# Patient Record
Sex: FEMALE | Race: WHITE | URBAN - METROPOLITAN AREA
[De-identification: names, ages, dates, MRNs, and addresses within clinical notes are randomized per-mention and may not be internally consistent; named-entity substitution may affect disease eponyms.]

---

## 2021-08-03 ENCOUNTER — OFFICE VISIT (OUTPATIENT)
Dept: ENDOCRINOLOGY | Age: 31
End: 2021-08-03
Payer: COMMERCIAL

## 2021-08-03 VITALS
HEIGHT: 64 IN | OXYGEN SATURATION: 99 % | WEIGHT: 194 LBS | HEART RATE: 65 BPM | SYSTOLIC BLOOD PRESSURE: 118 MMHG | TEMPERATURE: 98 F | BODY MASS INDEX: 33.12 KG/M2 | RESPIRATION RATE: 14 BRPM | DIASTOLIC BLOOD PRESSURE: 80 MMHG

## 2021-08-03 DIAGNOSIS — E03.9 ACQUIRED HYPOTHYROIDISM: Primary | ICD-10-CM

## 2021-08-03 DIAGNOSIS — N91.5 OLIGOMENORRHEA, UNSPECIFIED TYPE: ICD-10-CM

## 2021-08-03 DIAGNOSIS — E66.9 CLASS 1 OBESITY WITH BODY MASS INDEX (BMI) OF 33.0 TO 33.9 IN ADULT, UNSPECIFIED OBESITY TYPE, UNSPECIFIED WHETHER SERIOUS COMORBIDITY PRESENT: ICD-10-CM

## 2021-08-03 DIAGNOSIS — E06.3 HASHIMOTO'S THYROIDITIS: ICD-10-CM

## 2021-08-03 DIAGNOSIS — R63.5 WEIGHT GAIN, ABNORMAL: ICD-10-CM

## 2021-08-03 PROBLEM — E66.811 CLASS 1 OBESITY WITH BODY MASS INDEX (BMI) OF 33.0 TO 33.9 IN ADULT: Status: ACTIVE | Noted: 2021-08-03

## 2021-08-03 PROCEDURE — 99205 OFFICE O/P NEW HI 60 MIN: CPT | Performed by: INTERNAL MEDICINE

## 2021-08-03 RX ORDER — LEVOTHYROXINE SODIUM 0.1 MG/1
TABLET ORAL DAILY
COMMUNITY
Start: 2021-05-13 | End: 2021-08-16 | Stop reason: DRUGHIGH

## 2021-08-03 NOTE — PROGRESS NOTES
SUBJECTIVE:  Patrica Gillespie is a 32 y.o. female who is being evaluated for hypothyroidism. 1. Acquired hypothyroidism  This started in 2004. Patient was diagnosed with hyperthyroidism, changed to hypothyroidism 2012. The problem has been gradually worsening. Patient has 3 children, 8 9 1year old girls  Patient's weight used to be 125-150 lbs    Previous thyroid studies include: TSH and free thyroxine. Patient started medication in [id number]. Currently patient is on: Levothyroxine. Misses 0 doses a month. Current complaints: fatigue, brain fog, weight gain, bleeding, cold hands, anxiety, depression, no motivation    2. Hashimoto's thyroiditis  History of obstructive symptoms: difficulty swallowing No, changes in voice/hoarseness Yes, raspier weight. History of radiation to patient's neck: No  Resent iodine exposure: No  Family history includes hypothyroidism father, grandmother  Family history of thyroid cancer: No    3. Class 1 obesity with body mass index (BMI) of 33.0 to 33.9 in adult, unspecified obesity type, unspecified whether serious comorbidity present  Patient eats healthy, exercises  Ideal weight 160 lbs  In 2015 patient was on Adipex and lost weight. She was getting medication in weight loss clinic in [place]. Tries to eat healthy, more vegetables, fish, meat  Never tried other weight loss medications     4. Oligomenorrhea, unspecified type  Menstrual cycles irregular, delayed by 12 days  Started menstrual cycle at 6years old  On legs has thighs she has black hair  Was told may have PCOS    5. Weight gain, abnormal  Dx at age 15. At that time she lost weight. In [id number] she gained weight. Fluctuated thyroid hormone levels. Patient states that her weight is fluctuating, she gains and loses weight. Eats healthy, exercises.       Past Medical History:   Diagnosis Date    Hashimoto's thyroiditis     Hypothyroidism     PCOS (polycystic ovarian syndrome)      Patient Active Problem List    Diagnosis Date Noted    Weight gain, abnormal 2021    Acquired hypothyroidism 2021    Hashimoto's thyroiditis 2021    Class 1 obesity with body mass index (BMI) of 33.0 to 33.9 in adult 2021    Oligomenorrhea 2021     Past Surgical History:   Procedure Laterality Date    BACK SURGERY      LUMBAR L2-L4    CYST INCISION AND DRAINAGE      OVARIAN CYST REMOVAL      TONSILLECTOMY       Family History   Problem Relation Age of Onset    Hyperthyroidism Father     Hyperthyroidism Paternal Aunt     Hyperthyroidism Paternal Grandmother      Social History     Socioeconomic History    Marital status:      Spouse name: None    Number of children: None    Years of education: None    Highest education level: None   Occupational History    None   Tobacco Use    Smoking status: Former Smoker     Packs/day: 0.50     Years: 5.00     Pack years: 2.50     Types: Cigarettes     Quit date:      Years since quittin.6    Smokeless tobacco: Never Used   Vaping Use    Vaping Use: Every day    Substances: Nicotine   Substance and Sexual Activity    Alcohol use: Yes     Comment: OCCASIONAL    Drug use: Never    Sexual activity: Yes     Partners: Male   Other Topics Concern    None   Social History Narrative    None     Social Determinants of Health     Financial Resource Strain:     Difficulty of Paying Living Expenses:    Food Insecurity:     Worried About Running Out of Food in the Last Year:     Ran Out of Food in the Last Year:    Transportation Needs:     Lack of Transportation (Medical):      Lack of Transportation (Non-Medical):    Physical Activity:     Days of Exercise per Week:     Minutes of Exercise per Session:    Stress:     Feeling of Stress :    Social Connections:     Frequency of Communication with Friends and Family:     Frequency of Social Gatherings with Friends and Family:     Attends Yazidism Services:     Active Member of Clubs or Organizations:     Attends Club or Organization Meetings:     Marital Status:    Intimate Partner Violence:     Fear of Current or Ex-Partner:     Emotionally Abused:     Physically Abused:     Sexually Abused:      Current Outpatient Medications   Medication Sig Dispense Refill    levothyroxine (SYNTHROID) 100 MCG tablet daily       No current facility-administered medications for this visit.      No Known Allergies  Family Status   Relation Name Status    Father  (Not Specified)    PAunt  (Not Specified)    PGM  (Not Specified)       Review of Systems:  Constitutional: has fatigue, no fever, has recent weight gain, no recent weight loss, no changes in appetite  Eyes: no eye pain, no change in vision, no eye redness, no eye irritation, no double vision  Ears, nose, throat: has nasal congestion, no sore throat, no earache, no decrease in hearing, no hoarseness, no dry mouth, has sinus problems, no difficulty swallowing, no neck lumps, no dental problems, no mouth sores, no ringing in ears  Pulmonary: no shortness of breath, no wheezing, has dyspnea on exertion, no cough  Cardiovascular: no chest pain, no lower extremity edema, no orthopnea, no intermittent leg claudication, no palpitations  Gastrointestinal: no abdominal pain, no nausea, no vomiting, has diarrhea, has constipation, no dysphagia, no heartburn, no bloating  Genitourinary: no dysuria, no urinary incontinence, no urinary hesitancy, no urinary frequency, no feelings of urinary urgency, has nocturia  Musculoskeletal: no joint swelling, no joint stiffness, no joint pain, no muscle cramps, no muscle pain  Integument/Breast: has hair loss, no skin rashes, no skin lesions, no itching, has dry skin  Neurological: no numbness, no tingling, no weakness, no confusion, no headaches, no dizziness, no fainting, no tremors, has decrease in memory, no balance problems  Psychiatric: has anxiety, has depression, has insomnia  Hematologic/Lymphatic: no GLOB  No results found for: TSHFT4, TSH, FT3  No results found for: LABA1C  No results found for: EAG  No results found for: CHOL  No results found for: TRIG  No results found for: HDL  No results found for: LDLCHOLESTEROL, LDLCALC  No results found for: LABVLDL, VLDL  No results found for: CHOLHDLRATIO  No results found for: LABMICR, [id number]  No results found for: DHTN20     ASSESSMENT/PLAN:  1. Acquired hypothyroidism    - levothyroxine (SYNTHROID) 100 MCG tablet; daily  - DHEA-Sulfate; Future  - Testosterone, free, total; Future  - 17-Hydroxyprogesterone; Future  - Thyroid Stimulating Immunoglobulin; Future  - Thyrotropin receptor antibody; Future  - TSH without Reflex; Future  - Comprehensive Metabolic Panel; Future  - Anti-Thyroglobulin Antibody; Future  - T3, Reverse; Future    2. Hashimoto's thyroiditis    - T3, Free; Future  - T4; Future  - T4, Free; Future  - T3; Future  - Thyroid Peroxidase Antibody; Future  - Thyroid Stimulating Immunoglobulin; Future  - Thyrotropin receptor antibody; Future  - TSH without Reflex; Future  - Comprehensive Metabolic Panel; Future  - Anti-Thyroglobulin Antibody; Future  - T3, Reverse; Future  - US HEAD NECK SOFT TISSUE THYROID; Future    3. Class 1 obesity with body mass index (BMI) of 33.0 to 33.9 in adult, unspecified obesity type, unspecified whether serious comorbidity present      4. Oligomenorrhea, unspecified type    - ACTH; Future  - Prolactin; Future  - Cortisol AM, Total; Future  - Follicle Stimulating Hormone; Future  - Luteinizing Hormone; Future  - Insulin-Like Growth Factor; Future  - Estradiol; Future  - DHEA-Sulfate; Future  - Testosterone, free, total; Future  - 17-Hydroxyprogesterone; Future  - T3, Free; Future  - T4; Future  - T4, Free; Future  - T3; Future  - Thyroid Peroxidase Antibody; Future  - Thyroid Stimulating Immunoglobulin; Future  - Thyrotropin receptor antibody; Future  - TSH without Reflex; Future  - Comprehensive Metabolic Panel;  Future  - Anti-Thyroglobulin Antibody; Future  - T3, Reverse; Future  - SALIVARY CORTISOL; Future  - SALIVARY CORTISOL; Future  - SALIVARY CORTISOL; Future    5. Weight gain, abnormal    - ACTH; Future  - Prolactin; Future  - Cortisol AM, Total; Future  - Follicle Stimulating Hormone; Future  - Luteinizing Hormone; Future  - Insulin-Like Growth Factor; Future  - Estradiol; Future  - DHEA-Sulfate; Future  - Testosterone, free, total; Future  - 17-Hydroxyprogesterone; Future  - T3, Free; Future  - T4; Future  - T4, Free; Future  - T3; Future  - Thyroid Peroxidase Antibody; Future  - Thyroid Stimulating Immunoglobulin; Future  - Thyrotropin receptor antibody; Future  - TSH without Reflex; Future  - Comprehensive Metabolic Panel; Future  - Anti-Thyroglobulin Antibody; Future  - T3, Reverse; Future  - SALIVARY CORTISOL; Future  - SALIVARY CORTISOL; Future  - SALIVARY CORTISOL; Future      Reviewed and/or ordered clinical lab results Yes  Reviewed and/or ordered radiology tests No   Reviewed and/or ordered other diagnostic tests No  Discussed test results with performing physician No  Independently reviewed image, tracing, or specimen No  Made a decision to obtain old records No  Reviewed and summarized old records Yes   TSH 9.-74.12-3.13  TPO, Tg ab negative  Obtained history from other than patient No    Martina Andrews was counseled regarding symptoms of hypothyroidism, Hashimoto's thyroiditis, oligomenorrhea, abnormal weight gain diagnosis, course and complications of disease if inadequately treated, side effects of medications, diagnosis, treatment options, and prognosis, risks, benefits, complications, and alternatives of treatment, labs, imaging and other studies and treatment targets and goals, endocrine causes of weight gain, thyroid related symptoms. She understands instructions and counseling.     Total time I spent for this encounter 60 minutes    Return in about 1 month (around 9/3/2021) for thyroid problems.     Electronically signed by Jason Buckley MD on 8/7/2021 at 10:35 PM

## 2021-08-07 PROBLEM — R63.5 WEIGHT GAIN, ABNORMAL: Status: ACTIVE | Noted: 2021-08-07

## 2021-08-15 ENCOUNTER — TELEPHONE (OUTPATIENT)
Dept: ENDOCRINOLOGY | Age: 31
End: 2021-08-15

## 2021-08-15 LAB
ALBUMIN/GLOBULIN RATIO: 1.9 RATIO (ref 0.8–2.6)
ALBUMIN: 4.7 G/DL (ref 3.5–5.2)
ALP BLD-CCNC: 52 U/L (ref 23–144)
ALT SERPL-CCNC: 21 U/L (ref 0–60)
AST SERPL-CCNC: 22 U/L (ref 0–55)
BILIRUB SERPL-MCNC: 0.3 MG/DL (ref 0–1.2)
BUN BLDV-MCNC: 15 MG/DL (ref 3–29)
BUN/CREAT BLD: 14 (ref 7–25)
CALCIUM SERPL-MCNC: 10.1 MG/DL (ref 8.5–10.5)
CHLORIDE BLD-SCNC: 102 MEQ/L (ref 96–110)
CO2: 23 MEQ/L (ref 19–32)
CORTISOL - AM: 13.2 MCG/DL (ref 4–22)
CREAT SERPL-MCNC: 1.1 MG/DL (ref 0.5–1.2)
DHEAS (DHEA SULFATE): 154 MCG/DL (ref 40–325)
ESTRADIOL LEVEL: 22 PG/ML
FOLLICLE STIMULATING HORMONE: 6.9 MCIU/ML
GFR AFRICAN AMERICAN: 78 MLS/MIN/1.73M2
GFR NON-AFRICAN AMERICAN: 67 MLS/MIN/1.73M2
GLOBULIN: 2.5 G/DL (ref 1.9–3.6)
GLUCOSE BLD-MCNC: 98 MG/DL (ref 70–99)
LUTEINIZING HORMONE: 12.8 MIU/ML
POTASSIUM SERPL-SCNC: 4.5 MEQ/L (ref 3.4–5.3)
PROLACTIN: 15 NG/ML
SODIUM BLD-SCNC: 137 MEQ/L (ref 135–148)
STATUS: NORMAL
T3 FREE: 2.2 PG/ML (ref 2.3–4.2)
T3 TOTAL: 85 NG/DL (ref 80–200)
T4 FREE: 0.74 NG/DL (ref 0.8–1.8)
T4 TOTAL: 5.4 MCG/DL (ref 4–12.9)
TOTAL PROTEIN: 7.2 G/DL (ref 6–8.3)
TSH SERPL DL<=0.05 MIU/L-ACNC: 92.3 MCIU/ML (ref 0.4–4.5)

## 2021-08-16 LAB
ANTI-THYROGLOB ABS: 6.99
THYROID PEROXIDASE ANTIBODIES: 930 EIA

## 2021-08-16 RX ORDER — LEVOTHYROXINE SODIUM 125 MCG
125 TABLET ORAL
Qty: 30 TABLET | Refills: 3 | Status: SHIPPED | OUTPATIENT
Start: 2021-08-16 | End: 2021-11-02 | Stop reason: SDUPTHER

## 2021-08-16 NOTE — TELEPHONE ENCOUNTER
Patient is agreeable to trying the 0.125 daily. She states that she has been on Synthroid in the past and as far as she knows she did well with it. She is okay with trying it again if insurance is okay with it. Please send script to the pharmacy.

## 2021-08-16 NOTE — TELEPHONE ENCOUNTER
Please inform patient that the TSH came very abnormal, TSH was 92.3. Advise to increase levothyroxine. Please ask if she takes it away from food by 1 hour, and does not take any supplements with it. I recommend to increase levothyroxine to 0.125 mg daily. Ask if she tried brand Synthroid before. Might be more reliable absorption. Also inform patient that her thyroid has multiple small nodules, no biopsy or any other intervention at this time. Follow-up with thyroid ultrasound.

## 2021-08-16 NOTE — TELEPHONE ENCOUNTER
I sent prescription to pharmacy. If PA required, please indicate that patient failed levothyroxine and is not absorbing levothyroxine at all with TSH of 92.

## 2021-08-19 LAB — ADRENOCORTICOTROPIC HORMONE: 25 PG/ML (ref 6–50)

## 2021-08-20 LAB
IGF-1 (INSULIN-LIKE GROWTH I): 141 NG/ML (ref 53–331)
Lab: NORMAL
T3 REVERSE: 8 NG/DL (ref 8–25)
TESTOSTERONE FREE: 4.7 PG/ML (ref 0.1–6.4)
TESTOSTERONE TOTAL: 23 NG/DL (ref 2–45)

## 2021-08-22 LAB — 17-HYDROXYPROGESTERONE: 35 NG/DL

## 2021-11-02 ENCOUNTER — OFFICE VISIT (OUTPATIENT)
Dept: ENDOCRINOLOGY | Age: 31
End: 2021-11-02
Payer: COMMERCIAL

## 2021-11-02 VITALS
BODY MASS INDEX: 33.12 KG/M2 | OXYGEN SATURATION: 98 % | HEART RATE: 65 BPM | HEIGHT: 64 IN | RESPIRATION RATE: 14 BRPM | WEIGHT: 194 LBS | TEMPERATURE: 98 F | DIASTOLIC BLOOD PRESSURE: 68 MMHG | SYSTOLIC BLOOD PRESSURE: 116 MMHG

## 2021-11-02 DIAGNOSIS — E66.9 CLASS 1 OBESITY WITH BODY MASS INDEX (BMI) OF 33.0 TO 33.9 IN ADULT, UNSPECIFIED OBESITY TYPE, UNSPECIFIED WHETHER SERIOUS COMORBIDITY PRESENT: ICD-10-CM

## 2021-11-02 DIAGNOSIS — E04.2 MULTINODULAR THYROID: ICD-10-CM

## 2021-11-02 DIAGNOSIS — E06.3 HASHIMOTO'S THYROIDITIS: ICD-10-CM

## 2021-11-02 DIAGNOSIS — N91.5 OLIGOMENORRHEA, UNSPECIFIED TYPE: ICD-10-CM

## 2021-11-02 DIAGNOSIS — E03.9 ACQUIRED HYPOTHYROIDISM: Primary | ICD-10-CM

## 2021-11-02 PROCEDURE — 99215 OFFICE O/P EST HI 40 MIN: CPT | Performed by: INTERNAL MEDICINE

## 2021-11-02 RX ORDER — PHENTERMINE HYDROCHLORIDE 37.5 MG/1
37.5 TABLET ORAL
Qty: 30 TABLET | Refills: 0 | Status: SHIPPED | OUTPATIENT
Start: 2021-11-02 | End: 2021-12-02

## 2021-11-02 RX ORDER — LEVOTHYROXINE SODIUM 125 MCG
125 TABLET ORAL
Qty: 30 TABLET | Refills: 3 | Status: SHIPPED
Start: 2021-11-02 | End: 2021-11-08 | Stop reason: DRUGHIGH

## 2021-11-02 NOTE — PROGRESS NOTES
SUBJECTIVE:  Samantha Sanchez is a 32 y.o. female who is being evaluated for hypothyroidism. 1. Acquired hypothyroidism  This started in 2004. Patient was diagnosed with hyperthyroidism, changed to hypothyroidism 2012. The problem has been gradually worsening. Patient has 3 children, 8 9 1year old girls  Patient's weight used to be 125-150 lbs    Previous thyroid studies include: TSH and free thyroxine. Patient started medication in [id number]. Currently patient is on: Levothyroxine. Misses 0 doses a month. Current complaints: fatigue, brain fog, weight gain, bleeding, cold hands, anxiety, depression, no motivation    2. Hashimoto's thyroiditis  Has fatigue    3. Class 1 obesity with body mass index (BMI) of 33.0 to 33.9 in adult, unspecified obesity type, unspecified whether serious comorbidity present  Patient eats healthy, exercises  Ideal weight 160 lbs  In 2015 patient was on Adipex and lost weight. She was getting medication in weight loss clinic in [place]. Tries to eat healthy, more vegetables, fish, meat  Never tried other weight loss medications     4. Oligomenorrhea, unspecified type  Menstrual cycles irregular, delayed by 12 days  Started menstrual cycle at 6years old  On legs has thighs she has black hair  Was told may have PCOS    5. Weight gain, abnormal  Dx at age 15. At that time she lost weight. In [id number] she gained weight. Fluctuated thyroid hormone levels. Patient states that her weight is fluctuating, she gains and loses weight. Eats healthy, exercises. 6.  Multinodular thyroid  History of obstructive symptoms: difficulty swallowing No, changes in voice/hoarseness Yes, raspier weight.   History of radiation to patient's neck: No  Resent iodine exposure: No  Family history includes hypothyroidism father, grandmother  Family history of thyroid cancer: No      8/9/2021  EXAM:  US Thyroid.       CLINICAL: Hashimoto's thyroiditis       COMPARISON: There is no prior study for comparison.       TECHNIQUE: Grayscale and color Doppler ultrasound evaluation of the thyroid gland was    performed.       FINDINGS:       Right thyroid lobe measures 3.1 x 0.8 x 0.9 cm and left lobe measures 3.4 x 0.7 x 0.9 cm. Thyroid isthmus thickness 1.2 mm. Diffuse thyroid heterogeneous echogenicity is present with    normal appearing thyroid vascularity.       Inferior right thyroid 5 x 4 x 3 mm hypoechoic wider than tall solid appearing nodule with    internal flow on color Doppler is TI-RADS category 4 requiring no follow-up.       Superior left thyroid 4 x 4 by 3 mm wider than tall hypoechoic solid appearing nodule TI-RADS    category 4 requiring no follow-up. Additional superior left thyroid 4 x 4 by 2 mm wider than    tall hypoechoic solid appearing nodule TI-RADS category 4 requiring no follow-up.           IMPRESSION:       Heterogeneous thyroid echogenicity suggesting sequela of thyroiditis with bilateral 5 mm and    smaller solid TI-RADS category 4 nodules.  Based on ACR TI-RADS criteria no specific follow-up    recommended.                   Past Medical History:   Diagnosis Date    Hashimoto's thyroiditis     Hypothyroidism     PCOS (polycystic ovarian syndrome)      Patient Active Problem List    Diagnosis Date Noted    Multinodular thyroid 11/02/2021    Weight gain, abnormal 08/07/2021    Acquired hypothyroidism 08/03/2021    Hashimoto's thyroiditis 08/03/2021    Class 1 obesity with body mass index (BMI) of 33.0 to 33.9 in adult 08/03/2021    Oligomenorrhea 08/03/2021     Past Surgical History:   Procedure Laterality Date    BACK SURGERY      LUMBAR L2-L4    CYST INCISION AND DRAINAGE      OVARIAN CYST REMOVAL      TONSILLECTOMY       Family History   Problem Relation Age of Onset    Hyperthyroidism Father     Hyperthyroidism Paternal Aunt     Hyperthyroidism Paternal Grandmother      Social History     Socioeconomic History    Marital status:      Spouse name: None  Number of children: None    Years of education: None    Highest education level: None   Occupational History    None   Tobacco Use    Smoking status: Former Smoker     Packs/day: 0.50     Years: 5.00     Pack years: 2.50     Types: Cigarettes     Quit date:      Years since quittin.8    Smokeless tobacco: Never Used    Tobacco comment: quit in    Vaping Use    Vaping Use: Every day    Substances: Nicotine   Substance and Sexual Activity    Alcohol use: Not Currently     Comment: OCCASIONAL    Drug use: Never    Sexual activity: Yes     Partners: Male   Other Topics Concern    None   Social History Narrative    None     Social Determinants of Health     Financial Resource Strain:     Difficulty of Paying Living Expenses:    Food Insecurity:     Worried About Running Out of Food in the Last Year:     Ran Out of Food in the Last Year:    Transportation Needs:     Lack of Transportation (Medical):  Lack of Transportation (Non-Medical):    Physical Activity:     Days of Exercise per Week:     Minutes of Exercise per Session:    Stress:     Feeling of Stress :    Social Connections:     Frequency of Communication with Friends and Family:     Frequency of Social Gatherings with Friends and Family:     Attends [employer] Services:     Active Member of Clubs or Organizations:     Attends Club or Organization Meetings:     Marital Status:    Intimate Partner Violence:     Fear of Current or Ex-Partner:     Emotionally Abused:     Physically Abused:     Sexually Abused:      Current Outpatient Medications   Medication Sig Dispense Refill    SYNTHROID 125 MCG tablet Take 1 tablet by mouth every morning (before breakfast) 30 tablet 3    phentermine (ADIPEX-P) 37.5 MG tablet Take 1 tablet by mouth every morning (before breakfast) for 30 days. 30 tablet 0     No current facility-administered medications for this visit.      No Known Allergies  Family Status   Relation Name Status kg/m².     OBJECTIVE:  Constitutional: no acute distress, well appearing and well nourished  Psychiatric: oriented to person, place and time, judgement and insight and normal, recent and remote memory and intact and mood and affect are normal  Skin: skin and subcutaneous tissue is normal without mass, normal turgor  Head and Face: examination of head and face revealed no abnormalities  Eyes: no lid or conjunctival swelling, erythema or discharge, pupils are normal, equal, round, reactive to light  Ears/Nose: external inspection of ears and nose revealed no abnormalities, hearing is grossly normal  Oropharynx/Mouth/Face: lips, tongue and gums are normal with no lesions, the voice quality was normal  Neck: neck is supple and symmetric, with midline trachea and no masses, thyroid is normal  Lymphatics: normal cervical lymph nodes, normal supraclavicular nodes  Pulmonary: no increased work of breathing or signs of respiratory distress, lungs are clear to auscultation  Cardiovascular: normal heart rate and rhythm, normal S1 and S2, no murmurs and pedal pulses and 2+ bilaterally, No edema  Abdomen: abdomen is soft, non-tender with no masses  Musculoskeletal: normal gait and station and exam of the digits and nails are normal  Neurological: normal coordination and normal general cortical function      Lab Review:    No results found for: WBC, HGB, HCT, MCV, PLT  Lab Results   Component Value Date     08/14/2021    K 4.5 08/14/2021     08/14/2021    CO2 23 08/14/2021    BUN 15 08/14/2021    CREATININE 1.1 08/14/2021    GLUCOSE 98 08/14/2021    CALCIUM 10.1 08/14/2021    PROT 7.2 08/14/2021    LABALBU 4.7 08/14/2021    BILITOT 0.3 08/14/2021    ALKPHOS 52 08/14/2021    AST 22 08/14/2021    ALT 21 08/14/2021    LABGLOM 67 08/14/2021    GFRAA 78 08/14/2021    GLOB 2.5 08/14/2021     Lab Results   Component Value Date    TSH 92.300 08/14/2021    FT3 2.2 08/14/2021     No results found for: LABA1C  No results found for: EAG  No results found for: CHOL  No results found for: TRIG  No results found for: HDL  No results found for: LDLCHOLESTEROL, LDLCALC  No results found for: LABVLDL, VLDL  No results found for: CHOLHDLRATIO  No results found for: LABMICR, [id number]  No results found for: VITD25     ASSESSMENT/PLAN:  1. Acquired hypothyroidism  Had fluctuating levels before  Call for results  Severely uncontrolled  Changed levothyroxine to Synthroid then increased to 0.125 mg daily last appointment  TSH 92.3  - TSH without Reflex; Future  Free T4  Free T3    2. Hashimoto's thyroiditis  Positive TPO antibodies  Positive thyroglobulin antibodies  - Thyroid Peroxidase Antibody; Future  - Anti-Thyroglobulin Antibody; Future    3. Class 1 obesity with body mass index (BMI) of 33.0 to 33.9 in adult, unspecified obesity type, unspecified whether serious comorbidity present  Continue Diet, exercise  Eats healthier  Start Phentermine    4. Oligomenorrhea, unspecified type  Continue monitoring  Prolactin 15  Estradiol, LH, FSH, 17 hydroxyprogesterone, IGF-I normal  DHEA-S 154  Testosterone normal  - SALIVARY CORTISOL; Future  - SALIVARY CORTISOL; Future  - SALIVARY CORTISOL; Future    5. Weight gain, abnormal  Diet, exercise  Obtain salivary cortisol    6. Multinodular thyroid  Small nodules  Thyroid ultrasound 8/9/2021right 5 mm, left 4 mm and another 4 mm nodules.   Counseled patient about monitoring thyroid nodule growth      Reviewed and/or ordered clinical lab results Yes  Reviewed and/or ordered radiology tests No   Reviewed and/or ordered other diagnostic tests No  Discussed test results with performing physician No  Independently reviewed image, tracing, or specimen No  Made a decision to obtain old records No  Reviewed and summarized old records Yes   TSH 9.-74.12-3.13  TPO, Tg ab negative  Obtained history from other than patient No    Divine Tilley was counseled regarding symptoms of hypothyroidism, Hashimoto's thyroiditis, oligomenorrhea, abnormal weight gain diagnosis, course and complications of disease if inadequately treated, side effects of medications, diagnosis, treatment options, and prognosis, risks, benefits, complications, and alternatives of treatment, labs, imaging and other studies and treatment targets and goals, endocrine causes of weight gain, thyroid related symptoms, weight management, phentermine side effects  She understands instructions and counseling. Total time I spent for this encounter 40 minutes    Return in about 1 month (around 12/2/2021) for weight management, thyroid problems.     Electronically signed by Jerry Monroy MD on 11/2/2021 at 10:27 PM

## 2021-11-07 LAB
T3 FREE: 3 PG/ML (ref 2.3–4.2)
T4 FREE: 1.17 NG/DL (ref 0.8–1.8)
TSH SERPL DL<=0.05 MIU/L-ACNC: 7.76 MCIU/ML (ref 0.4–4.5)

## 2021-11-08 RX ORDER — LEVOTHYROXINE SODIUM 137 MCG
137 TABLET ORAL
Qty: 30 TABLET | Refills: 3 | Status: SHIPPED
Start: 2021-11-08 | End: 2022-03-07 | Stop reason: DRUGHIGH

## 2021-11-11 LAB
CORTISOL, LC/MS/MS,SALIVA: <0.03 MCG/DL

## 2022-03-04 ENCOUNTER — TELEPHONE (OUTPATIENT)
Dept: ENDOCRINOLOGY | Age: 32
End: 2022-03-04

## 2022-03-04 ENCOUNTER — OFFICE VISIT (OUTPATIENT)
Dept: ENDOCRINOLOGY | Age: 32
End: 2022-03-04
Payer: COMMERCIAL

## 2022-03-04 VITALS
OXYGEN SATURATION: 99 % | DIASTOLIC BLOOD PRESSURE: 66 MMHG | TEMPERATURE: 98 F | WEIGHT: 189 LBS | SYSTOLIC BLOOD PRESSURE: 108 MMHG | RESPIRATION RATE: 14 BRPM | HEIGHT: 64 IN | BODY MASS INDEX: 32.27 KG/M2 | HEART RATE: 56 BPM

## 2022-03-04 DIAGNOSIS — E06.3 HASHIMOTO'S THYROIDITIS: Primary | ICD-10-CM

## 2022-03-04 DIAGNOSIS — R63.5 WEIGHT GAIN, ABNORMAL: ICD-10-CM

## 2022-03-04 DIAGNOSIS — E03.9 ACQUIRED HYPOTHYROIDISM: ICD-10-CM

## 2022-03-04 DIAGNOSIS — E03.9 ACQUIRED HYPOTHYROIDISM: Primary | ICD-10-CM

## 2022-03-04 DIAGNOSIS — E04.2 MULTINODULAR THYROID: ICD-10-CM

## 2022-03-04 DIAGNOSIS — E06.3 HASHIMOTO'S THYROIDITIS: ICD-10-CM

## 2022-03-04 DIAGNOSIS — N91.5 OLIGOMENORRHEA, UNSPECIFIED TYPE: ICD-10-CM

## 2022-03-04 DIAGNOSIS — E66.9 CLASS 1 OBESITY WITH BODY MASS INDEX (BMI) OF 32.0 TO 32.9 IN ADULT, UNSPECIFIED OBESITY TYPE, UNSPECIFIED WHETHER SERIOUS COMORBIDITY PRESENT: ICD-10-CM

## 2022-03-04 LAB
T3 FREE: 2.8 PG/ML (ref 2.3–4.2)
T4 FREE: 1.1 NG/DL (ref 0.9–1.8)
TSH SERPL DL<=0.05 MIU/L-ACNC: 16.07 UIU/ML (ref 0.27–4.2)

## 2022-03-04 PROCEDURE — 99214 OFFICE O/P EST MOD 30 MIN: CPT | Performed by: INTERNAL MEDICINE

## 2022-03-04 RX ORDER — PHENTERMINE AND TOPIRAMATE 3.75; 23 MG/1; MG/1
1 CAPSULE, EXTENDED RELEASE ORAL DAILY
Qty: 14 CAPSULE | Refills: 0 | Status: SHIPPED | OUTPATIENT
Start: 2022-03-07 | End: 2022-03-21

## 2022-03-04 RX ORDER — PHENTERMINE HYDROCHLORIDE 37.5 MG/1
37.5 TABLET ORAL
Qty: 30 TABLET | Refills: 0 | Status: SHIPPED
Start: 2022-03-04 | End: 2022-03-04 | Stop reason: CLARIF

## 2022-03-04 RX ORDER — PHENTERMINE AND TOPIRAMATE 7.5; 46 MG/1; MG/1
1 CAPSULE, EXTENDED RELEASE ORAL DAILY
Qty: 28 CAPSULE | Refills: 0 | Status: SHIPPED | OUTPATIENT
Start: 2022-03-21 | End: 2022-04-18

## 2022-03-04 NOTE — TELEPHONE ENCOUNTER
Phentermine is not covered for another 6 months and needs the other medication that was reviewed. Pt could remember the other name but Dr. Janet Schwartz knows. Please advise?

## 2022-03-04 NOTE — TELEPHONE ENCOUNTER
Spoke with patient regarding denial of phentermine. We will start Qsymia 3.75/23 mg dose for 2weeks, then 7.5/46 mg dose for 4 weeks. Please fill the form and send both prescriptions to specialty pharmacy. I informed patient that there is opening on Wednesday, April 13 at 3:10 PM in [facility]. Please schedule patient for that appointment for weight follow-up. Sent patient a message regarding lab results. Recommended to increase Synthroid to 0.150 mg daily. I do not see the response to the message. Please make sure patient saw the message. Let me know and I will send a prescription for the new dose of levothyroxine 0.15 mg daily.

## 2022-03-04 NOTE — PROGRESS NOTES
SUBJECTIVE:  Skylar Michele is a 28 y.o. female who is being evaluated for hypothyroidism. 1. Acquired hypothyroidism  This started in 2004. Patient was diagnosed with hyperthyroidism, changed to hypothyroidism 2012. The problem has been gradually worsening. Patient has 3 children, 8 9 1year old girls  Patient's weight used to be 125-150 lbs    Previous thyroid studies include: TSH and free thyroxine. Patient started medication in 0425-2107. Currently patient is on: Levothyroxine. Misses 0 doses a month. Current complaints: fatigue, brain fog, weight gain, bleeding, cold hands, anxiety, depression, no motivation    2. Hashimoto's thyroiditis  Has fatigue    3. Obesity  Patient eats healthy, exercises  Ideal weight 160 lbs  In 2015 patient was on Adipex and lost weight. She was getting medication in weight loss clinic in [place]. Tries to eat healthy, more vegetables, fish, meat  Never tried other weight loss medications     4. Oligomenorrhea, unspecified type  Menstrual cycles irregular, delayed by 12 days  Started menstrual cycle at 6years old  On legs has thighs she has black hair  Was told may have PCOS  Dx at age 15. At that time she lost weight. In 4928-5986 she gained weight. Fluctuated thyroid hormone levels. Patient states that her weight is fluctuating, she gains and loses weight. Eats healthy, exercises. 5.  Multinodular thyroid  History of obstructive symptoms: difficulty swallowing No, changes in voice/hoarseness Yes, raspier weight.   History of radiation to patient's neck: No  Resent iodine exposure: No  Family history includes hypothyroidism father, grandmother  Family history of thyroid cancer: No      8/9/2021  EXAM:  US Thyroid.       CLINICAL: Hashimoto's thyroiditis       COMPARISON: There is no prior study for comparison.       TECHNIQUE: Grayscale and color Doppler ultrasound evaluation of the thyroid gland was    performed.       FINDINGS:       Right thyroid lobe measures 3.1 x 0.8 x 0.9 cm and left lobe measures 3.4 x 0.7 x 0.9 cm. Thyroid isthmus thickness 1.2 mm. Diffuse thyroid heterogeneous echogenicity is present with    normal appearing thyroid vascularity.       Inferior right thyroid 5 x 4 x 3 mm hypoechoic wider than tall solid appearing nodule with    internal flow on color Doppler is TI-RADS category 4 requiring no follow-up.       Superior left thyroid 4 x 4 by 3 mm wider than tall hypoechoic solid appearing nodule TI-RADS    category 4 requiring no follow-up. Additional superior left thyroid 4 x 4 by 2 mm wider than    tall hypoechoic solid appearing nodule TI-RADS category 4 requiring no follow-up.           IMPRESSION:       Heterogeneous thyroid echogenicity suggesting sequela of thyroiditis with bilateral 5 mm and    smaller solid TI-RADS category 4 nodules.  Based on ACR TI-RADS criteria no specific follow-up    recommended.                   Past Medical History:   Diagnosis Date    Hashimoto's thyroiditis     Hypothyroidism     PCOS (polycystic ovarian syndrome)      Patient Active Problem List    Diagnosis Date Noted    Multinodular thyroid 11/02/2021    Weight gain, abnormal 08/07/2021    Acquired hypothyroidism 08/03/2021    Hashimoto's thyroiditis 08/03/2021    Class 1 obesity with body mass index (BMI) of 33.0 to 33.9 in adult 08/03/2021    Oligomenorrhea 08/03/2021     Past Surgical History:   Procedure Laterality Date    BACK SURGERY      LUMBAR L2-L4    CYST INCISION AND DRAINAGE      OVARIAN CYST REMOVAL      TONSILLECTOMY       Family History   Problem Relation Age of Onset    Hyperthyroidism Father     Hyperthyroidism Paternal Aunt     Hyperthyroidism Paternal Grandmother      Social History     Socioeconomic History    Marital status:      Spouse name: None    Number of children: None    Years of education: None    Highest education level: None   Occupational History    None   Tobacco Use    Smoking status: Former Smoker     Packs/day: 0.50     Years: 5.00     Pack years: 2.50     Types: Cigarettes     Quit date: 2015     Years since quittin.1    Smokeless tobacco: Never Used    Tobacco comment: quit in    Vaping Use    Vaping Use: Every day    Substances: Nicotine   Substance and Sexual Activity    Alcohol use: Not Currently     Comment: OCCASIONAL    Drug use: Never    Sexual activity: Yes     Partners: Male   Other Topics Concern    None   Social History Narrative    None     Social Determinants of Health     Financial Resource Strain:     Difficulty of Paying Living Expenses: Not on file   Food Insecurity:     Worried About Running Out of Food in the Last Year: Not on file    Jong of Food in the Last Year: Not on file   Transportation Needs:     Lack of Transportation (Medical): Not on file    Lack of Transportation (Non-Medical): Not on file   Physical Activity:     Days of Exercise per Week: Not on file    Minutes of Exercise per Session: Not on file   Stress:     Feeling of Stress : Not on file   Social Connections:     Frequency of Communication with Friends and Family: Not on file    Frequency of Social Gatherings with Friends and Family: Not on file    Attends Judaism Services: Not on file    Active Member of [address] [place] [address] [employer] or Organizations: Not on file    Attends Club or Organization Meetings: Not on file    Marital Status: Not on file   Intimate Partner Violence:     Fear of Current or Ex-Partner: Not on file    Emotionally Abused: Not on file    Physically Abused: Not on file    Sexually Abused: Not on file   Housing Stability:     Unable to Pay for Housing in the Last Year: Not on file    Number of [place] in the Last Year: Not on file    Unstable Housing in the Last Year: Not on file     Current Outpatient Medications   Medication Sig Dispense Refill    [START ON 3/7/2022] QSYMIA 3.75-23 MG CP24 Take 1 capsule by mouth daily for 14 days.  14 capsule 0    [START ON 3/21/2022] QSYMIA 7.5-46 MG CP24 Take 1 capsule by mouth daily for 28 days. 28 capsule 0    SYNTHROID 137 MCG tablet Take 1 tablet by mouth every morning (before breakfast) 30 tablet 3     No current facility-administered medications for this visit.      No Known Allergies  Family Status   Relation Name Status    Father  (Not Specified)    PAunt  (Not Specified)    PGM  (Not Specified)       Review of Systems:  Constitutional: has fatigue, no fever, has recent weight gain, no recent weight loss, no changes in appetite  Eyes: no eye pain, no change in vision, no eye redness, no eye irritation, no double vision  Ears, nose, throat: has nasal congestion, no sore throat, no earache, no decrease in hearing, no hoarseness, no dry mouth, has sinus problems, no difficulty swallowing, no neck lumps, no dental problems, no mouth sores, no ringing in ears  Pulmonary: no shortness of breath, no wheezing, has dyspnea on exertion, no cough  Cardiovascular: no chest pain, no lower extremity edema, no orthopnea, no intermittent leg claudication, no palpitations  Gastrointestinal: no abdominal pain, no nausea, no vomiting, has diarrhea, has constipation, no dysphagia, no heartburn, no bloating  Genitourinary: no dysuria, no urinary incontinence, no urinary hesitancy, no urinary frequency, no feelings of urinary urgency, has nocturia  Musculoskeletal: no joint swelling, no joint stiffness, no joint pain, no muscle cramps, no muscle pain  Integument/Breast: has hair loss, no skin rashes, no skin lesions, no itching, has dry skin  Neurological: no numbness, no tingling, no weakness, no confusion, no headaches, no dizziness, no fainting, no tremors, has decrease in memory, no balance problems  Psychiatric: has anxiety, has depression, has insomnia  Hematologic/Lymphatic: no tendency for easy bleeding, no swollen lymph nodes, no tendency for easy bruising  Immunology: has seasonal allergies, no frequent infections, no frequent illnesses  Endocrine: has temperature intolerance    /66   Pulse 56   Temp 98 °F (36.7 °C)   Resp 14   Ht 5' 4\" (1.626 m)   Wt 189 lb (85.7 kg)   SpO2 99%   BMI 32.44 kg/m²    Wt Readings from Last 3 Encounters:   03/04/22 189 lb (85.7 kg)   11/02/21 194 lb (88 kg)   08/03/21 194 lb (88 kg)     Body mass index is 32.44 kg/m².     OBJECTIVE:  Constitutional: no acute distress, well appearing and well nourished  Psychiatric: oriented to person, place and time, judgement and insight and normal, recent and remote memory and intact and mood and affect are normal  Skin: skin and subcutaneous tissue is normal without mass, normal turgor  Head and Face: examination of head and face revealed no abnormalities  Eyes: no lid or conjunctival swelling, erythema or discharge, pupils are normal, equal, round, reactive to light  Ears/Nose: external inspection of ears and nose revealed no abnormalities, hearing is grossly normal  Oropharynx/Mouth/Face: lips, tongue and gums are normal with no lesions, the voice quality was normal  Neck: neck is supple and symmetric, with midline trachea and no masses, thyroid is normal  Lymphatics: normal cervical lymph nodes, normal supraclavicular nodes  Pulmonary: no increased work of breathing or signs of respiratory distress, lungs are clear to auscultation  Cardiovascular: normal heart rate and rhythm, normal S1 and S2, no murmurs and pedal pulses and 2+ bilaterally, No edema  Abdomen: abdomen is soft, non-tender with no masses  Musculoskeletal: normal gait and station and exam of the digits and nails are normal  Neurological: normal coordination and normal general cortical function      Lab Review:    No results found for: WBC, HGB, HCT, MCV, PLT  Lab Results   Component Value Date     08/14/2021    K 4.5 08/14/2021     08/14/2021    CO2 23 08/14/2021    BUN 15 08/14/2021    CREATININE 1.1 08/14/2021    GLUCOSE 98 08/14/2021    CALCIUM 10.1 08/14/2021    PROT 7.2 08/14/2021    LABALBU 4.7 08/14/2021    BILITOT 0.3 08/14/2021    ALKPHOS 52 08/14/2021    AST 22 08/14/2021    ALT 21 08/14/2021    LABGLOM 67 08/14/2021    GFRAA 78 08/14/2021    GLOB 2.5 08/14/2021     Lab Results   Component Value Date    TSH 7.760 11/06/2021    FT3 3.0 11/06/2021     No results found for: LABA1C  No results found for: EAG  No results found for: CHOL  No results found for: TRIG  No results found for: HDL  No results found for: LDLCHOLESTEROL, LDLCALC  No results found for: LABVLDL, VLDL  No results found for: CHOLHDLRATIO  No results found for: LABMICR, [id number]  No results found for: VITD25     ASSESSMENT/PLAN:  1. Acquired hypothyroidism  Call with results, adjust medication dose  Had fluctuating levels before  Severely uncontrolled  Changed levothyroxine to Synthroid then increased to 0.137 mg daily  TSH 92.3-7.76  - TSH without Reflex; Future  -Free T4  -Free T3    2. Hashimoto's thyroiditis  Positive TPO antibodies  Positive thyroglobulin antibodies  - Thyroid Peroxidase Antibody; Future  - Anti-Thyroglobulin Antibody; Future    3. Obesity  Continue Diet, exercise  Eats healthier  Had 1 month of Phentermine, could not get perez to continue, also had Covid twice and was sick for few months. I prescribed phentermine, but prescription was not filled because it was too close to the previous prescription. Patient need to have 6month time between phentermine prescriptions. I discussed Qsymia with patient during the appointment. Patient sent a message that she would like to start Qsymia. We will start Qsymia 2 weeks of 3.75/23 mg dose, and then 4 weeks of 7.5/46 mg dose. Will send to specialty pharmacy. Salivary cortisol negative in 3 samples    4. Oligomenorrhea, unspecified type  Continue monitoring  Prolactin 15  Estradiol, LH, FSH, 17 hydroxyprogesterone, IGF-I normal  DHEA-S 154  Testosterone normal  3 salivary cortisol samples negative    5.   Multinodular thyroid  Small nodules  Thyroid ultrasound 8/9/2021-right 5 mm, left 4 mm and another 4 mm nodules. Counseled patient about monitoring thyroid nodule growth      Reviewed and/or ordered clinical lab results Yes  Reviewed and/or ordered radiology tests No   Reviewed and/or ordered other diagnostic tests No  Discussed test results with performing physician No  Independently reviewed image, tracing, or specimen No  Made a decision to obtain old records No  Reviewed and summarized old records Yes   TSH 9.-74.12-3.13  TPO, Tg ab negative  Obtained history from other than patient No    Erasmo Golden was counseled regarding symptoms of hypothyroidism, Hashimoto's thyroiditis, oligomenorrhea, abnormal weight gain diagnosis, course and complications of disease if inadequately treated, side effects of medications, diagnosis, treatment options, and prognosis, risks, benefits, complications, and alternatives of treatment, labs, imaging and other studies and treatment targets and goals, endocrine causes of weight gain, thyroid related symptoms, weight management, phentermine side effects  She understands instructions and counseling. Return in about 6 weeks (around 4/15/2022) for weight management.     Electronically signed by Lorena Shields MD on 3/4/2022 at 10:56 PM

## 2022-03-07 RX ORDER — LEVOTHYROXINE SODIUM 150 MCG
150 TABLET ORAL
Qty: 30 TABLET | Refills: 3 | Status: SHIPPED | OUTPATIENT
Start: 2022-03-07

## 2022-03-07 NOTE — TELEPHONE ENCOUNTER
Called and informed pt, pt expressed understanding. Thyroid goes to [address], pt agreeable. Pt will see us at [facility].

## 2022-04-13 ENCOUNTER — OFFICE VISIT (OUTPATIENT)
Dept: ENDOCRINOLOGY | Age: 32
End: 2022-04-13
Payer: COMMERCIAL

## 2022-04-13 VITALS
HEART RATE: 87 BPM | DIASTOLIC BLOOD PRESSURE: 87 MMHG | BODY MASS INDEX: 31.24 KG/M2 | WEIGHT: 183 LBS | HEIGHT: 64 IN | OXYGEN SATURATION: 95 % | RESPIRATION RATE: 14 BRPM | TEMPERATURE: 98 F | SYSTOLIC BLOOD PRESSURE: 131 MMHG

## 2022-04-13 DIAGNOSIS — E06.3 HASHIMOTO'S THYROIDITIS: ICD-10-CM

## 2022-04-13 DIAGNOSIS — N91.5 OLIGOMENORRHEA, UNSPECIFIED TYPE: ICD-10-CM

## 2022-04-13 DIAGNOSIS — E03.9 ACQUIRED HYPOTHYROIDISM: Primary | ICD-10-CM

## 2022-04-13 DIAGNOSIS — E66.9 CLASS 1 OBESITY WITH BODY MASS INDEX (BMI) OF 33.0 TO 33.9 IN ADULT, UNSPECIFIED OBESITY TYPE, UNSPECIFIED WHETHER SERIOUS COMORBIDITY PRESENT: ICD-10-CM

## 2022-04-13 DIAGNOSIS — E04.2 MULTINODULAR THYROID: ICD-10-CM

## 2022-04-13 DIAGNOSIS — E66.9 CLASS 1 OBESITY WITH BODY MASS INDEX (BMI) OF 32.0 TO 32.9 IN ADULT, UNSPECIFIED OBESITY TYPE, UNSPECIFIED WHETHER SERIOUS COMORBIDITY PRESENT: ICD-10-CM

## 2022-04-13 PROCEDURE — 99214 OFFICE O/P EST MOD 30 MIN: CPT | Performed by: INTERNAL MEDICINE

## 2022-04-13 RX ORDER — PHENTERMINE AND TOPIRAMATE 7.5; 46 MG/1; MG/1
1 CAPSULE, EXTENDED RELEASE ORAL DAILY
Qty: 30 CAPSULE | Refills: 0 | Status: SHIPPED | OUTPATIENT
Start: 2022-04-27 | End: 2022-05-27

## 2022-04-13 NOTE — PROGRESS NOTES
SUBJECTIVE:  Rin Sweeney is a 28 y.o. female who is being evaluated for hypothyroidism. 1. Acquired hypothyroidism  This started in 2004. Patient was diagnosed with hyperthyroidism, changed to hypothyroidism 2012. The problem has been gradually worsening. Patient has 3 children, 8 9 1year old girls  Patient's weight used to be 125-150 lbs    Previous thyroid studies include: TSH and free thyroxine. Patient started medication in 1038-9838. Currently patient is on: Levothyroxine. Misses 0 doses a month. Current complaints: fatigue, brain fog, weight gain, bleeding, cold hands, anxiety, depression, no motivation    2. Hashimoto's thyroiditis  Has fatigue    3. Obesity  Patient eats healthy, exercises  Ideal weight 160 lbs  In 2015 patient was on Adipex and lost weight. She was getting medication in weight loss clinic in [place]. Tries to eat healthy, more vegetables, fish, meat  Never tried other weight loss medications     4. Oligomenorrhea, unspecified type  Menstrual cycles irregular, delayed by 12 days  Started menstrual cycle at 6years old  On legs has thighs she has black hair  Was told may have PCOS  Dx at age 15. At that time she lost weight. In 0319-0921 she gained weight. Fluctuated thyroid hormone levels. Patient states that her weight is fluctuating, she gains and loses weight. Eats healthy, exercises. 5.  Multinodular thyroid  History of obstructive symptoms: difficulty swallowing No, changes in voice/hoarseness Yes, raspier weight.   History of radiation to patient's neck: No  Resent iodine exposure: No  Family history includes hypothyroidism father, grandmother  Family history of thyroid cancer: No      8/9/2021  EXAM:  US Thyroid.       CLINICAL: Hashimoto's thyroiditis       COMPARISON: There is no prior study for comparison.       TECHNIQUE: Grayscale and color Doppler ultrasound evaluation of the thyroid gland was    performed.       FINDINGS:       Right thyroid lobe measures 3.1 x 0.8 x 0.9 cm and left lobe measures 3.4 x 0.7 x 0.9 cm. Thyroid isthmus thickness 1.2 mm. Diffuse thyroid heterogeneous echogenicity is present with    normal appearing thyroid vascularity.       Inferior right thyroid 5 x 4 x 3 mm hypoechoic wider than tall solid appearing nodule with    internal flow on color Doppler is TI-RADS category 4 requiring no follow-up.       Superior left thyroid 4 x 4 by 3 mm wider than tall hypoechoic solid appearing nodule TI-RADS    category 4 requiring no follow-up. Additional superior left thyroid 4 x 4 by 2 mm wider than    tall hypoechoic solid appearing nodule TI-RADS category 4 requiring no follow-up.           IMPRESSION:       Heterogeneous thyroid echogenicity suggesting sequela of thyroiditis with bilateral 5 mm and    smaller solid TI-RADS category 4 nodules.  Based on ACR TI-RADS criteria no specific follow-up    recommended.                   Past Medical History:   Diagnosis Date    Hashimoto's thyroiditis     Hypothyroidism     PCOS (polycystic ovarian syndrome)      Patient Active Problem List    Diagnosis Date Noted    Multinodular thyroid 11/02/2021    Weight gain, abnormal 08/07/2021    Acquired hypothyroidism 08/03/2021    Hashimoto's thyroiditis 08/03/2021    Class 1 obesity with body mass index (BMI) of 33.0 to 33.9 in adult 08/03/2021    Oligomenorrhea 08/03/2021     Past Surgical History:   Procedure Laterality Date    BACK SURGERY      LUMBAR L2-L4    CYST INCISION AND DRAINAGE      OVARIAN CYST REMOVAL      TONSILLECTOMY       Family History   Problem Relation Age of Onset    Hyperthyroidism Father     Hyperthyroidism Paternal Aunt     Hyperthyroidism Paternal Grandmother      Social History     Socioeconomic History    Marital status:      Spouse name: None    Number of children: None    Years of education: None    Highest education level: None   Occupational History    None   Tobacco Use    Smoking status: Former Smoker     Packs/day: 0.50     Years: 5.00     Pack years: 2.50     Types: Cigarettes     Quit date: 2015     Years since quittin.2    Smokeless tobacco: Never Used    Tobacco comment: quit in    Vaping Use    Vaping Use: Every day    Substances: Nicotine   Substance and Sexual Activity    Alcohol use: Not Currently     Comment: OCCASIONAL    Drug use: Never    Sexual activity: Yes     Partners: Male   Other Topics Concern    None   Social History Narrative    None     Social Determinants of Health     Financial Resource Strain:     Difficulty of Paying Living Expenses: Not on file   Food Insecurity:     Worried About Running Out of Food in the Last Year: Not on file    Jong of Food in the Last Year: Not on file   Transportation Needs:     Lack of Transportation (Medical): Not on file    Lack of Transportation (Non-Medical): Not on file   Physical Activity:     Days of Exercise per Week: Not on file    Minutes of Exercise per Session: Not on file   Stress:     Feeling of Stress : Not on file   Social Connections:     Frequency of Communication with Friends and Family: Not on file    Frequency of Social Gatherings with Friends and Family: Not on file    Attends Rastafari Services: Not on file    Active Member of [address] [place] [address] [employer] or Organizations: Not on file    Attends Club or Organization Meetings: Not on file    Marital Status: Not on file   Intimate Partner Violence:     Fear of Current or Ex-Partner: Not on file    Emotionally Abused: Not on file    Physically Abused: Not on file    Sexually Abused: Not on file   Housing Stability:     Unable to Pay for Housing in the Last Year: Not on file    Number of [place] in the Last Year: Not on file    Unstable Housing in the Last Year: Not on file     Current Outpatient Medications   Medication Sig Dispense Refill    [START ON 2022] Phentermine-Topiramate (QSYMIA) 7.5-46 MG CP24 Take 1 capsule by mouth daily for 30 days.  27 capsule 0    SYNTHROID 150 MCG tablet Take 1 tablet by mouth every morning (before breakfast) 30 tablet 3    QSYMIA 7.5-46 MG CP24 Take 1 capsule by mouth daily for 28 days. 28 capsule 0     No current facility-administered medications for this visit.      No Known Allergies  Family Status   Relation Name Status    Father  (Not Specified)    PAunt  (Not Specified)    PGM  (Not Specified)       Review of Systems:  Constitutional: has fatigue, no fever, has recent weight gain, no recent weight loss, no changes in appetite  Eyes: no eye pain, no change in vision, no eye redness, no eye irritation, no double vision  Ears, nose, throat: has nasal congestion, no sore throat, no earache, no decrease in hearing, no hoarseness, no dry mouth, has sinus problems, no difficulty swallowing, no neck lumps, no dental problems, no mouth sores, no ringing in ears  Pulmonary: no shortness of breath, no wheezing, has dyspnea on exertion, no cough  Cardiovascular: no chest pain, no lower extremity edema, no orthopnea, no intermittent leg claudication, no palpitations  Gastrointestinal: no abdominal pain, no nausea, no vomiting, has diarrhea, has constipation, no dysphagia, no heartburn, no bloating  Genitourinary: no dysuria, no urinary incontinence, no urinary hesitancy, no urinary frequency, no feelings of urinary urgency, has nocturia  Musculoskeletal: no joint swelling, no joint stiffness, no joint pain, no muscle cramps, no muscle pain  Integument/Breast: has hair loss, no skin rashes, no skin lesions, no itching, has dry skin  Neurological: no numbness, no tingling, no weakness, no confusion, no headaches, no dizziness, no fainting, no tremors, has decrease in memory, no balance problems  Psychiatric: has anxiety, has depression, has insomnia  Hematologic/Lymphatic: no tendency for easy bleeding, no swollen lymph nodes, no tendency for easy bruising  Immunology: has seasonal allergies, no frequent infections, no frequent illnesses  Endocrine: has temperature intolerance    /87   Pulse 87   Temp 98 °F (36.7 °C)   Resp 14   Ht 5' 4\" (1.626 m)   Wt 183 lb (83 kg)   SpO2 95%   BMI 31.41 kg/m²    Wt Readings from Last 3 Encounters:   04/13/22 183 lb (83 kg)   03/04/22 189 lb (85.7 kg)   11/02/21 194 lb (88 kg)     Body mass index is 31.41 kg/m².     OBJECTIVE:  Constitutional: no acute distress, well appearing and well nourished  Psychiatric: oriented to person, place and time, judgement and insight and normal, recent and remote memory and intact and mood and affect are normal  Skin: skin and subcutaneous tissue is normal without mass, normal turgor  Head and Face: examination of head and face revealed no abnormalities  Eyes: no lid or conjunctival swelling, erythema or discharge, pupils are normal, equal, round, reactive to light  Ears/Nose: external inspection of ears and nose revealed no abnormalities, hearing is grossly normal  Oropharynx/Mouth/Face: lips, tongue and gums are normal with no lesions, the voice quality was normal  Neck: neck is supple and symmetric, with midline trachea and no masses, thyroid is normal  Lymphatics: normal cervical lymph nodes, normal supraclavicular nodes  Pulmonary: no increased work of breathing or signs of respiratory distress, lungs are clear to auscultation  Cardiovascular: normal heart rate and rhythm, normal S1 and S2, no murmurs and pedal pulses and 2+ bilaterally, No edema  Abdomen: abdomen is soft, non-tender with no masses  Musculoskeletal: normal gait and station and exam of the digits and nails are normal  Neurological: normal coordination and normal general cortical function      Lab Review:    No results found for: WBC, HGB, HCT, MCV, PLT  Lab Results   Component Value Date     08/14/2021    K 4.5 08/14/2021     08/14/2021    CO2 23 08/14/2021    BUN 15 08/14/2021    CREATININE 1.1 08/14/2021    GLUCOSE 98 08/14/2021    CALCIUM 10.1 08/14/2021    PROT 7.2 08/14/2021    LABALBU 4.7 08/14/2021    BILITOT 0.3 08/14/2021    ALKPHOS 52 08/14/2021    AST 22 08/14/2021    ALT 21 08/14/2021    LABGLOM 67 08/14/2021    GFRAA 78 08/14/2021    GLOB 2.5 08/14/2021     Lab Results   Component Value Date    TSH 16.07 03/04/2022    FT3 2.8 03/04/2022     No results found for: LABA1C  No results found for: EAG  No results found for: CHOL  No results found for: TRIG  No results found for: HDL  No results found for: LDLCHOLESTEROL, LDLCALC  No results found for: LABVLDL, VLDL  No results found for: CHOLHDLRATIO  No results found for: LABMICR, [id number]  No results found for: VITD25     ASSESSMENT/PLAN:  1. Acquired hypothyroidism  Worse  Call with results, adjust medication dose  Had fluctuating levels before  Severely uncontrolled  Synthroid increased to 0.150 mg daily in 3/2022  TSH 92.3-7.76-16.07  - TSH without Reflex; Future  -Free T4  -Free T3    2. Hashimoto's thyroiditis  Positive TPO antibodies  Positive thyroglobulin antibodies  - Thyroid Peroxidase Antibody; Future  - Anti-Thyroglobulin Antibody; Future    3. Obesity  Continue Diet, exercise  Eats healthier  Had 1 month of Phentermine in 11/2021, could not get perez to continue, also had Covid twice and was sick for few months. On Qsymia 7.5/56 mg daily since 4/5  Continue same dose, will send Rx to pharmacy  Salivary cortisol negative in 3 samples    4. Oligomenorrhea, unspecified type  Continue monitoring  Prolactin 15  Estradiol, LH, FSH, 17 hydroxyprogesterone, IGF-I normal  DHEA-S 154  Testosterone normal  3 salivary cortisol samples negative    5. Multinodular thyroid  Small nodules  Thyroid ultrasound 8/9/2021-right 5 mm, left 4 mm and another 4 mm nodules.   Counseled patient about monitoring thyroid nodule growth      Reviewed and/or ordered clinical lab results Yes  Reviewed and/or ordered radiology tests No   Reviewed and/or ordered other diagnostic tests No  Discussed test results with performing physician No  Independently reviewed image, tracing, or specimen No  Made a decision to obtain old records No  Reviewed and summarized old records Yes   TSH 9.-74.12-3.13  TPO, Tg ab negative  Obtained history from other than patient No    Darrion Partida was counseled regarding symptoms of hypothyroidism, Hashimoto's thyroiditis, oligomenorrhea, abnormal weight gain diagnosis, course and complications of disease if inadequately treated, side effects of medications, diagnosis, treatment options, and prognosis, risks, benefits, complications, and alternatives of treatment, labs, imaging and other studies and treatment targets and goals, endocrine causes of weight gain, thyroid related symptoms, weight management, phentermine side effects  She understands instructions and counseling. Return in about 8 weeks (around 6/7/2022) for thyroid problems.     Electronically signed by Mukesh Graham MD on 4/13/2022 at 3:40 PM

## 2023-04-06 ENCOUNTER — OFFICE VISIT (OUTPATIENT)
Dept: ENDOCRINOLOGY | Age: 33
End: 2023-04-06
Payer: COMMERCIAL

## 2023-04-06 VITALS
DIASTOLIC BLOOD PRESSURE: 57 MMHG | WEIGHT: 180 LBS | BODY MASS INDEX: 30.73 KG/M2 | OXYGEN SATURATION: 98 % | RESPIRATION RATE: 14 BRPM | HEIGHT: 64 IN | SYSTOLIC BLOOD PRESSURE: 99 MMHG | TEMPERATURE: 98 F | HEART RATE: 61 BPM

## 2023-04-06 DIAGNOSIS — E66.9 CLASS 1 OBESITY WITH BODY MASS INDEX (BMI) OF 30.0 TO 30.9 IN ADULT, UNSPECIFIED OBESITY TYPE, UNSPECIFIED WHETHER SERIOUS COMORBIDITY PRESENT: ICD-10-CM

## 2023-04-06 DIAGNOSIS — E03.9 ACQUIRED HYPOTHYROIDISM: Primary | ICD-10-CM

## 2023-04-06 DIAGNOSIS — E04.2 MULTINODULAR THYROID: ICD-10-CM

## 2023-04-06 DIAGNOSIS — N91.5 OLIGOMENORRHEA, UNSPECIFIED TYPE: ICD-10-CM

## 2023-04-06 DIAGNOSIS — E06.3 HASHIMOTO'S THYROIDITIS: ICD-10-CM

## 2023-04-06 PROBLEM — E66.811 CLASS 1 OBESITY WITH BODY MASS INDEX (BMI) OF 32.0 TO 32.9 IN ADULT: Status: ACTIVE | Noted: 2023-04-06

## 2023-04-06 PROBLEM — E66.811 CLASS 1 OBESITY WITH BODY MASS INDEX (BMI) OF 30.0 TO 30.9 IN ADULT: Status: ACTIVE | Noted: 2023-04-06

## 2023-04-06 PROCEDURE — 99214 OFFICE O/P EST MOD 30 MIN: CPT | Performed by: INTERNAL MEDICINE

## 2023-04-06 RX ORDER — PHENTERMINE AND TOPIRAMATE 3.75; 23 MG/1; MG/1
1 CAPSULE, EXTENDED RELEASE ORAL DAILY
Qty: 30 CAPSULE | Refills: 0 | Status: SHIPPED | OUTPATIENT
Start: 2023-04-06 | End: 2023-05-06

## 2023-04-06 RX ORDER — LEVOTHYROXINE SODIUM 175 UG/1
175 TABLET ORAL DAILY
Qty: 30 TABLET | Refills: 3 | Status: SHIPPED | OUTPATIENT
Start: 2023-04-06

## 2023-04-06 NOTE — PROGRESS NOTES
normal supraclavicular nodes  Pulmonary: no increased work of breathing or signs of respiratory distress, lungs are clear to auscultation  Cardiovascular: normal heart rate and rhythm, normal S1 and S2, no murmurs and pedal pulses and 2+ bilaterally, No edema  Abdomen: abdomen is soft, non-tender with no masses  Musculoskeletal: normal gait and station and exam of the digits and nails are normal  Neurological: normal coordination and normal general cortical function      Lab Review:    No results found for: WBC, HGB, HCT, MCV, PLT  Lab Results   Component Value Date/Time     08/14/2021 07:48 AM    K 4.5 08/14/2021 07:48 AM     08/14/2021 07:48 AM    CO2 23 08/14/2021 07:48 AM    BUN 15 08/14/2021 07:48 AM    CREATININE 1.1 08/14/2021 07:48 AM    GLUCOSE 98 08/14/2021 07:48 AM    CALCIUM 10.1 08/14/2021 07:48 AM    PROT 7.2 08/14/2021 07:48 AM    LABALBU 4.7 08/14/2021 07:48 AM    BILITOT 0.3 08/14/2021 07:48 AM    ALKPHOS 52 08/14/2021 07:48 AM    AST 22 08/14/2021 07:48 AM    ALT 21 08/14/2021 07:48 AM    LABGLOM 67 08/14/2021 07:48 AM    GFRAA 78 08/14/2021 07:48 AM    GLOB 2.5 08/14/2021 07:48 AM     Lab Results   Component Value Date/Time    TSH 16.07 03/04/2022 11:11 AM    FT3 2.8 03/04/2022 11:11 AM     No results found for: LABA1C  No results found for: EAG  No results found for: CHOL  No results found for: TRIG  No results found for: HDL  No results found for: LDLCHOLESTEROL, LDLCALC  No results found for: LABVLDL, VLDL  No results found for: CHOLHDLRATIO  No results found for: LABMICR, [id number]  No results found for: VITD25     ASSESSMENT/PLAN:  1. Acquired hypothyroidism  Worse  Had fluctuating levels before  Severely uncontrolled  Synthroid increased to 0.150 mg daily in 3/2022  Increase Levothyroxine to 0.175 mg daily  TSH 92.3-7.76-16.07  - TSH without Reflex; Future  -Free T4  -Free T3    2.  Hashimoto's thyroiditis  Positive TPO antibodies  Positive thyroglobulin antibodies  - Thyroid

## 2023-05-04 ENCOUNTER — OFFICE VISIT (OUTPATIENT)
Dept: ENDOCRINOLOGY | Age: 33
End: 2023-05-04
Payer: COMMERCIAL

## 2023-05-04 VITALS
WEIGHT: 177 LBS | HEART RATE: 92 BPM | HEIGHT: 64 IN | TEMPERATURE: 98 F | BODY MASS INDEX: 30.22 KG/M2 | OXYGEN SATURATION: 97 % | RESPIRATION RATE: 14 BRPM | DIASTOLIC BLOOD PRESSURE: 59 MMHG | SYSTOLIC BLOOD PRESSURE: 138 MMHG

## 2023-05-04 DIAGNOSIS — E06.3 HASHIMOTO'S THYROIDITIS: ICD-10-CM

## 2023-05-04 DIAGNOSIS — E66.9 CLASS 1 OBESITY WITH BODY MASS INDEX (BMI) OF 30.0 TO 30.9 IN ADULT, UNSPECIFIED OBESITY TYPE, UNSPECIFIED WHETHER SERIOUS COMORBIDITY PRESENT: ICD-10-CM

## 2023-05-04 DIAGNOSIS — E03.9 ACQUIRED HYPOTHYROIDISM: Primary | ICD-10-CM

## 2023-05-04 PROCEDURE — 1036F TOBACCO NON-USER: CPT | Performed by: INTERNAL MEDICINE

## 2023-05-04 PROCEDURE — G8427 DOCREV CUR MEDS BY ELIG CLIN: HCPCS | Performed by: INTERNAL MEDICINE

## 2023-05-04 PROCEDURE — 99214 OFFICE O/P EST MOD 30 MIN: CPT | Performed by: INTERNAL MEDICINE

## 2023-05-04 PROCEDURE — G8417 CALC BMI ABV UP PARAM F/U: HCPCS | Performed by: INTERNAL MEDICINE

## 2023-05-04 RX ORDER — PHENTERMINE AND TOPIRAMATE 7.5; 46 MG/1; MG/1
1 CAPSULE, EXTENDED RELEASE ORAL DAILY
Qty: 30 CAPSULE | Refills: 0 | Status: SHIPPED | OUTPATIENT
Start: 2023-05-04 | End: 2023-06-03

## 2023-05-04 RX ORDER — LEVOTHYROXINE SODIUM 175 UG/1
TABLET ORAL
Qty: 40 TABLET | Refills: 3 | Status: SHIPPED | OUTPATIENT
Start: 2023-05-04

## 2023-05-04 NOTE — PROGRESS NOTES
SUBJECTIVE:  Danna Penn is a 35 y.o. female who is being evaluated for hypothyroidism. 1. Acquired hypothyroidism  This started in 2004. Patient was diagnosed with hyperthyroidism, changed to hypothyroidism 2012. The problem has been gradually worsening. Patient has 3 children, 8 9 1year old girls  Patient's weight used to be 125-150 lbs    Previous thyroid studies include: TSH and free thyroxine. Patient started medication in 4717-1639. Currently patient is on: Levothyroxine. Misses 0 doses a month. Current complaints: fatigue, brain fog, bleeding, cold hands, anxiety, depression, no motivation  Brain fog severe, can not retain information. Has new job. Still tired, but slightly less. 2. Hashimoto's thyroiditis  Has fatigue    3. Obesity  Patient eats healthy, exercises  Ideal weight 160 lbs  In 3507-3851 patient was on Adipex and lost weight. She was getting medication in weight loss clinic in [place]. Tries to eat healthy, more vegetables, fish, meat  Never tried other weight loss medications   On phentermine in 111/2021. Was not able to come for appointment because of Covid. On Qsymia 1/2022-6/2022.    4. Oligomenorrhea, unspecified type  Menstrual cycles irregular, delayed by 12 days  Started menstrual cycle at 6years old  On legs has thighs she has black hair  Was told may have PCOS  Dx at age 15. At that time she lost weight. In 6544-1004 she gained weight. Fluctuated thyroid hormone levels. Patient states that her weight is fluctuating, she gains and loses weight. Eats healthy, exercises. 5.  Multinodular thyroid  History of obstructive symptoms: difficulty swallowing No, changes in voice/hoarseness Yes, raspier weight. History of radiation to patient's neck: No  Resent iodine exposure: No  Family history includes hypothyroidism father, grandmother  Family history of thyroid cancer: No      8/9/2021  EXAM:  US Thyroid.        CLINICAL: Hashimoto's thyroiditis

## 2023-06-08 ENCOUNTER — OFFICE VISIT (OUTPATIENT)
Dept: ENDOCRINOLOGY | Age: 33
End: 2023-06-08
Payer: COMMERCIAL

## 2023-06-08 VITALS
HEIGHT: 64 IN | DIASTOLIC BLOOD PRESSURE: 59 MMHG | RESPIRATION RATE: 14 BRPM | WEIGHT: 168 LBS | OXYGEN SATURATION: 98 % | TEMPERATURE: 98 F | SYSTOLIC BLOOD PRESSURE: 106 MMHG | HEART RATE: 78 BPM | BODY MASS INDEX: 28.68 KG/M2

## 2023-06-08 DIAGNOSIS — E06.3 HASHIMOTO'S THYROIDITIS: ICD-10-CM

## 2023-06-08 DIAGNOSIS — E04.2 MULTINODULAR THYROID: ICD-10-CM

## 2023-06-08 DIAGNOSIS — E66.9 CLASS 1 OBESITY WITH BODY MASS INDEX (BMI) OF 30.0 TO 30.9 IN ADULT, UNSPECIFIED OBESITY TYPE, UNSPECIFIED WHETHER SERIOUS COMORBIDITY PRESENT: ICD-10-CM

## 2023-06-08 DIAGNOSIS — N91.5 OLIGOMENORRHEA, UNSPECIFIED TYPE: ICD-10-CM

## 2023-06-08 DIAGNOSIS — E03.9 ACQUIRED HYPOTHYROIDISM: Primary | ICD-10-CM

## 2023-06-08 LAB
T3 FREE: 2.1 PG/ML (ref 2.3–4.2)
T4 FREE: 0.76 NG/DL (ref 0.8–1.8)
TSH SERPL DL<=0.05 MIU/L-ACNC: 18.1 MCIU/ML (ref 0.4–4.5)

## 2023-06-08 PROCEDURE — 1036F TOBACCO NON-USER: CPT | Performed by: INTERNAL MEDICINE

## 2023-06-08 PROCEDURE — G8427 DOCREV CUR MEDS BY ELIG CLIN: HCPCS | Performed by: INTERNAL MEDICINE

## 2023-06-08 PROCEDURE — 99214 OFFICE O/P EST MOD 30 MIN: CPT | Performed by: INTERNAL MEDICINE

## 2023-06-08 PROCEDURE — G8417 CALC BMI ABV UP PARAM F/U: HCPCS | Performed by: INTERNAL MEDICINE

## 2023-06-08 RX ORDER — PHENTERMINE AND TOPIRAMATE 11.25; 69 MG/1; MG/1
CAPSULE, EXTENDED RELEASE ORAL
Qty: 30 CAPSULE | Refills: 0 | Status: SHIPPED | OUTPATIENT
Start: 2023-06-08 | End: 2023-07-08

## 2023-06-08 RX ORDER — LEVOTHYROXINE SODIUM 0.2 MG/1
200 TABLET ORAL DAILY
Qty: 30 TABLET | Refills: 2 | Status: SHIPPED | OUTPATIENT
Start: 2023-06-08

## 2023-06-08 NOTE — PROGRESS NOTES
and prognosis, risks, benefits, complications, and alternatives of treatment, labs, imaging and other studies and treatment targets and goals, endocrine causes of weight gain, thyroid related symptoms, weight management, phentermine side effects  She understands instructions and counseling. Return in about 1 month (around 7/8/2023) for thyroid problems.     Electronically signed by Mavrin Grider MD on 6/8/2023 at 4:22 PM

## 2023-07-11 ENCOUNTER — OFFICE VISIT (OUTPATIENT)
Dept: ENDOCRINOLOGY | Age: 33
End: 2023-07-11
Payer: COMMERCIAL

## 2023-07-11 VITALS
RESPIRATION RATE: 14 BRPM | WEIGHT: 168 LBS | BODY MASS INDEX: 28.68 KG/M2 | TEMPERATURE: 98 F | DIASTOLIC BLOOD PRESSURE: 71 MMHG | HEART RATE: 78 BPM | SYSTOLIC BLOOD PRESSURE: 105 MMHG | OXYGEN SATURATION: 98 % | HEIGHT: 64 IN

## 2023-07-11 DIAGNOSIS — E06.3 HASHIMOTO'S THYROIDITIS: ICD-10-CM

## 2023-07-11 DIAGNOSIS — E03.9 ACQUIRED HYPOTHYROIDISM: Primary | ICD-10-CM

## 2023-07-11 DIAGNOSIS — E04.2 MULTINODULAR THYROID: ICD-10-CM

## 2023-07-11 DIAGNOSIS — E66.9 CLASS 1 OBESITY WITH BODY MASS INDEX (BMI) OF 30.0 TO 30.9 IN ADULT, UNSPECIFIED OBESITY TYPE, UNSPECIFIED WHETHER SERIOUS COMORBIDITY PRESENT: ICD-10-CM

## 2023-07-11 DIAGNOSIS — N91.5 OLIGOMENORRHEA, UNSPECIFIED TYPE: ICD-10-CM

## 2023-07-11 PROCEDURE — 1036F TOBACCO NON-USER: CPT | Performed by: INTERNAL MEDICINE

## 2023-07-11 PROCEDURE — G8417 CALC BMI ABV UP PARAM F/U: HCPCS | Performed by: INTERNAL MEDICINE

## 2023-07-11 PROCEDURE — 99213 OFFICE O/P EST LOW 20 MIN: CPT | Performed by: INTERNAL MEDICINE

## 2023-07-11 PROCEDURE — G8427 DOCREV CUR MEDS BY ELIG CLIN: HCPCS | Performed by: INTERNAL MEDICINE

## 2023-07-11 RX ORDER — PHENTERMINE AND TOPIRAMATE 7.5; 46 MG/1; MG/1
1 CAPSULE, EXTENDED RELEASE ORAL DAILY
Qty: 30 CAPSULE | Refills: 2 | Status: SHIPPED | OUTPATIENT
Start: 2023-07-11 | End: 2023-08-10

## 2023-07-11 NOTE — PROGRESS NOTES
SUBJECTIVE:  James Hughes is a 35 y.o. female who is being evaluated for weight management. 1. Obesity  Patient eats healthy, exercises  Ideal weight 160 lbs  In 7960-2861 patient was on Adipex and lost weight. She was getting medication in weight loss clinic in [place]. Tries to eat healthy, more vegetables, fish, meat  Never tried other weight loss medications   On phentermine in 111/2021. Was not able to come for appointment because of Covid. On Qsymia 1/2022-6/2022. Now on Qsymia since 4/2023. Wt starting ws 180 lbs. 2. Oligomenorrhea, unspecified type  Menstrual cycles irregular, delayed by 12 days  Started menstrual cycle at 6years old  On legs has thighs she has black hair  Was told may have PCOS  Dx at age 15. At that time she lost weight. In 7901-1448 she gained weight. Fluctuated thyroid hormone levels. Patient states that her weight is fluctuating, she gains and loses weight. Eats healthy, exercises.       Past Medical History:   Diagnosis Date    Hashimoto's thyroiditis     Hypothyroidism     PCOS (polycystic ovarian syndrome)      Patient Active Problem List    Diagnosis Date Noted    Class 1 obesity with body mass index (BMI) of 32.0 to 32.9 in adult 04/06/2023    Class 1 obesity with body mass index (BMI) of 30.0 to 30.9 in adult 04/06/2023    Multinodular thyroid 11/02/2021    Weight gain, abnormal 08/07/2021    Acquired hypothyroidism 08/03/2021    Hashimoto's thyroiditis 08/03/2021    Class 1 obesity with body mass index (BMI) of 33.0 to 33.9 in adult 08/03/2021    Oligomenorrhea 08/03/2021     Past Surgical History:   Procedure Laterality Date    BACK SURGERY      LUMBAR L2-L4    CYST INCISION AND DRAINAGE      OVARIAN CYST REMOVAL      TONSILLECTOMY       Family History   Problem Relation Age of Onset    Hyperthyroidism Father     Hyperthyroidism Paternal Aunt     Hyperthyroidism Paternal Grandmother      Social History     Socioeconomic History    Marital status:

## 2024-01-14 PROBLEM — E66.3 OVERWEIGHT (BMI 25.0-29.9): Status: ACTIVE | Noted: 2024-01-14

## 2024-01-14 PROBLEM — Z86.39 HISTORY OF OBESITY: Status: ACTIVE | Noted: 2024-01-14

## 2024-01-15 ENCOUNTER — OFFICE VISIT (OUTPATIENT)
Dept: ENDOCRINOLOGY | Age: 34
End: 2024-01-15
Payer: COMMERCIAL

## 2024-01-15 VITALS
HEIGHT: 64 IN | HEART RATE: 67 BPM | OXYGEN SATURATION: 98 % | RESPIRATION RATE: 14 BRPM | SYSTOLIC BLOOD PRESSURE: 115 MMHG | BODY MASS INDEX: 31.76 KG/M2 | DIASTOLIC BLOOD PRESSURE: 67 MMHG | TEMPERATURE: 98 F | WEIGHT: 186 LBS

## 2024-01-15 DIAGNOSIS — E06.3 HASHIMOTO'S THYROIDITIS: ICD-10-CM

## 2024-01-15 DIAGNOSIS — E66.9 CLASS 1 OBESITY WITH BODY MASS INDEX (BMI) OF 31.0 TO 31.9 IN ADULT, UNSPECIFIED OBESITY TYPE, UNSPECIFIED WHETHER SERIOUS COMORBIDITY PRESENT: ICD-10-CM

## 2024-01-15 DIAGNOSIS — E03.9 ACQUIRED HYPOTHYROIDISM: Primary | ICD-10-CM

## 2024-01-15 DIAGNOSIS — E04.2 MULTINODULAR THYROID: ICD-10-CM

## 2024-01-15 DIAGNOSIS — N91.5 OLIGOMENORRHEA, UNSPECIFIED TYPE: ICD-10-CM

## 2024-01-15 DIAGNOSIS — E78.2 MIXED HYPERLIPIDEMIA: ICD-10-CM

## 2024-01-15 PROBLEM — E66.811 CLASS 1 OBESITY WITH BODY MASS INDEX (BMI) OF 31.0 TO 31.9 IN ADULT: Status: ACTIVE | Noted: 2024-01-15

## 2024-01-15 PROCEDURE — G8484 FLU IMMUNIZE NO ADMIN: HCPCS | Performed by: INTERNAL MEDICINE

## 2024-01-15 PROCEDURE — 1036F TOBACCO NON-USER: CPT | Performed by: INTERNAL MEDICINE

## 2024-01-15 PROCEDURE — G8417 CALC BMI ABV UP PARAM F/U: HCPCS | Performed by: INTERNAL MEDICINE

## 2024-01-15 PROCEDURE — G8427 DOCREV CUR MEDS BY ELIG CLIN: HCPCS | Performed by: INTERNAL MEDICINE

## 2024-01-15 PROCEDURE — 99214 OFFICE O/P EST MOD 30 MIN: CPT | Performed by: INTERNAL MEDICINE

## 2024-01-15 RX ORDER — LEVOTHYROXINE SODIUM 0.2 MG/1
200 TABLET ORAL DAILY
Qty: 30 TABLET | Refills: 3 | Status: SHIPPED | OUTPATIENT
Start: 2024-01-15

## 2024-01-15 RX ORDER — LEVOTHYROXINE SODIUM 0.05 MG/1
50 TABLET ORAL DAILY
Qty: 30 TABLET | Refills: 3 | Status: SHIPPED | OUTPATIENT
Start: 2024-01-15

## 2024-01-15 NOTE — PROGRESS NOTES
SUBJECTIVE:  Randolph Mcnulty is a 33 y.o. female who is being evaluated for hypothyroidism.     1. Acquired hypothyroidism  This started in 2004. Patient was diagnosed with hyperthyroidism, changed to hypothyroidism 2012. The problem has been gradually worsening.  Patient has 3 children, 10, 7, 3 year old girls  Patient's weight used to be 125-150 lbs    Previous thyroid studies include: TSH and free thyroxine.   Patient started medication in 6465-8289. Currently patient is on: Levothyroxine. Misses 0 doses a month.    Current complaints: fatigue, brain fog, uterine spotting, sleepiness, numbness and tingling  Brain fog severe, can not retain information.  Has new job.  Still tired, but slightly less.    2. Hashimoto's thyroiditis  Has fatigue    3.  Obesity  Patient eats healthy, exercises  Ideal weight 160 lbs  In 1516-9573 patient was on Adipex and lost weight.  She was getting medication in weight loss clinic in [place].  Tries to eat healthy, more vegetables, fish, meat  Never tried other weight loss medications   On phentermine in 111/2021. Was not able to come for appointment because of Covid.  On Qsymia 1/2022-6/2022.  Qsymia 4/2023-7/2023.     4. Oligomenorrhea, unspecified type  Menstrual cycles irregular, delayed by 12 days  Started menstrual cycle at 11 years old  On legs has thighs she has black hair  Was told may have PCOS  Dx at age 14.  At that time she lost weight.  In 8354-7230 she gained weight.  Fluctuated thyroid hormone levels.  Patient states that her weight is fluctuating, she gains and loses weight.  Eats healthy, exercises.    5.  Multinodular thyroid  History of obstructive symptoms: difficulty swallowing No, changes in voice/hoarseness Yes, raspier weight.  History of radiation to patient's neck: No  Resent iodine exposure: No  Family history includes hypothyroidism father, grandmother  Family history of thyroid cancer: No    6. Hyperlipidemia  No muscle pain      8/9/2021  EXAM:  US

## 2024-03-06 ENCOUNTER — OFFICE VISIT (OUTPATIENT)
Dept: ENDOCRINOLOGY | Age: 34
End: 2024-03-06
Payer: COMMERCIAL

## 2024-03-06 VITALS
RESPIRATION RATE: 14 BRPM | TEMPERATURE: 98 F | HEIGHT: 64 IN | HEART RATE: 73 BPM | SYSTOLIC BLOOD PRESSURE: 99 MMHG | WEIGHT: 183 LBS | OXYGEN SATURATION: 97 % | DIASTOLIC BLOOD PRESSURE: 58 MMHG | BODY MASS INDEX: 31.24 KG/M2

## 2024-03-06 DIAGNOSIS — N91.5 OLIGOMENORRHEA, UNSPECIFIED TYPE: ICD-10-CM

## 2024-03-06 DIAGNOSIS — E06.3 HASHIMOTO'S THYROIDITIS: ICD-10-CM

## 2024-03-06 DIAGNOSIS — E66.9 CLASS 1 OBESITY WITH BODY MASS INDEX (BMI) OF 31.0 TO 31.9 IN ADULT, UNSPECIFIED OBESITY TYPE, UNSPECIFIED WHETHER SERIOUS COMORBIDITY PRESENT: ICD-10-CM

## 2024-03-06 DIAGNOSIS — E78.2 MIXED HYPERLIPIDEMIA: ICD-10-CM

## 2024-03-06 DIAGNOSIS — E04.2 MULTINODULAR THYROID: ICD-10-CM

## 2024-03-06 DIAGNOSIS — E03.9 ACQUIRED HYPOTHYROIDISM: Primary | ICD-10-CM

## 2024-03-06 PROCEDURE — G8484 FLU IMMUNIZE NO ADMIN: HCPCS | Performed by: INTERNAL MEDICINE

## 2024-03-06 PROCEDURE — 1036F TOBACCO NON-USER: CPT | Performed by: INTERNAL MEDICINE

## 2024-03-06 PROCEDURE — G8417 CALC BMI ABV UP PARAM F/U: HCPCS | Performed by: INTERNAL MEDICINE

## 2024-03-06 PROCEDURE — 99214 OFFICE O/P EST MOD 30 MIN: CPT | Performed by: INTERNAL MEDICINE

## 2024-03-06 PROCEDURE — G8427 DOCREV CUR MEDS BY ELIG CLIN: HCPCS | Performed by: INTERNAL MEDICINE

## 2024-03-06 RX ORDER — LEVOTHYROXINE SODIUM 0.2 MG/1
200 TABLET ORAL DAILY
Qty: 90 TABLET | Refills: 1 | Status: SHIPPED | OUTPATIENT
Start: 2024-03-06

## 2024-03-06 RX ORDER — PHENTERMINE AND TOPIRAMATE 7.5; 46 MG/1; MG/1
1 CAPSULE, EXTENDED RELEASE ORAL DAILY
Qty: 30 CAPSULE | Refills: 4 | Status: SHIPPED | OUTPATIENT
Start: 2024-03-06 | End: 2024-08-05

## 2024-03-06 RX ORDER — LEVOTHYROXINE SODIUM 0.05 MG/1
50 TABLET ORAL DAILY
Qty: 90 TABLET | Refills: 1 | Status: SHIPPED | OUTPATIENT
Start: 2024-03-06

## 2024-03-06 NOTE — PROGRESS NOTES
SUBJECTIVE:  Randolph Mcnulty is a 34 y.o. female who is being evaluated for hypothyroidism.     1. Acquired hypothyroidism  This started in 2004. Patient was diagnosed with hyperthyroidism, changed to hypothyroidism 2012. The problem has been gradually worsening.  Patient has 3 children, 10, 7, 3 year old girls  Patient's weight used to be 125-150 lbs    Previous thyroid studies include: TSH and free thyroxine.   Patient started medication in 9296-8717. Currently patient is on: Levothyroxine. Misses 0 doses a month.    Current complaints: fatigue, brain fog, sleepiness, numbness and tingling, cold sensitivity, gained weight  Still tired, but slightly less.    2. Hashimoto's thyroiditis  Has fatigue    3.  Obesity  Patient eats healthy, exercises  Ideal weight 160 lbs  In 1881-9627 patient was on Adipex and lost weight.  She was getting medication in weight loss clinic in [place].  Tries to eat healthy, more vegetables, fish, meat  Never tried other weight loss medications   On phentermine in 111/2021. Was not able to come for appointment because of Covid.  On Qsymia 1/2022-6/2022.  Qsymia 4/2023-7/2023.     4. Oligomenorrhea, unspecified type  Menstrual cycles irregular, delayed by 12 days  Started menstrual cycle at 11 years old  On legs has thighs she has black hair  Was told may have PCOS  Dx at age 14.  At that time she lost weight.  In 8298-8628 she gained weight.  Fluctuated thyroid hormone levels.  Patient states that her weight is fluctuating, she gains and loses weight.  Eats healthy, exercises.    5.  Multinodular thyroid  History of obstructive symptoms: difficulty swallowing No, changes in voice/hoarseness Yes, raspier weight.  History of radiation to patient's neck: No  Resent iodine exposure: No  Family history includes hypothyroidism father, grandmother  Family history of thyroid cancer: No    6. Hyperlipidemia  No muscle pain      8/9/2021  EXAM:  US Thyroid.       CLINICAL: Hashimoto's thyroiditis

## 2024-08-06 ENCOUNTER — OFFICE VISIT (OUTPATIENT)
Dept: ENDOCRINOLOGY | Age: 34
End: 2024-08-06
Payer: COMMERCIAL

## 2024-08-06 VITALS
HEART RATE: 66 BPM | OXYGEN SATURATION: 99 % | DIASTOLIC BLOOD PRESSURE: 64 MMHG | BODY MASS INDEX: 28.68 KG/M2 | SYSTOLIC BLOOD PRESSURE: 108 MMHG | WEIGHT: 168 LBS | TEMPERATURE: 98 F | RESPIRATION RATE: 14 BRPM | HEIGHT: 64 IN

## 2024-08-06 DIAGNOSIS — E06.3 HASHIMOTO'S THYROIDITIS: ICD-10-CM

## 2024-08-06 DIAGNOSIS — Z86.39 HISTORY OF OBESITY: ICD-10-CM

## 2024-08-06 DIAGNOSIS — E04.2 MULTINODULAR THYROID: ICD-10-CM

## 2024-08-06 DIAGNOSIS — E03.9 ACQUIRED HYPOTHYROIDISM: ICD-10-CM

## 2024-08-06 DIAGNOSIS — N91.5 OLIGOMENORRHEA, UNSPECIFIED TYPE: ICD-10-CM

## 2024-08-06 DIAGNOSIS — E66.3 OVERWEIGHT (BMI 25.0-29.9): ICD-10-CM

## 2024-08-06 DIAGNOSIS — E03.9 ACQUIRED HYPOTHYROIDISM: Primary | ICD-10-CM

## 2024-08-06 DIAGNOSIS — E78.2 MIXED HYPERLIPIDEMIA: ICD-10-CM

## 2024-08-06 LAB
ALBUMIN SERPL-MCNC: 4.6 G/DL (ref 3.4–5)
ALBUMIN/GLOB SERPL: 1.7 {RATIO} (ref 1.1–2.2)
ALP SERPL-CCNC: 58 U/L (ref 40–129)
ALT SERPL-CCNC: 18 U/L (ref 10–40)
ANION GAP SERPL CALCULATED.3IONS-SCNC: 10 MMOL/L (ref 3–16)
AST SERPL-CCNC: 15 U/L (ref 15–37)
BILIRUB SERPL-MCNC: 0.3 MG/DL (ref 0–1)
BUN SERPL-MCNC: 10 MG/DL (ref 7–20)
CALCIUM SERPL-MCNC: 10 MG/DL (ref 8.3–10.6)
CHLORIDE SERPL-SCNC: 101 MMOL/L (ref 99–110)
CHOLEST SERPL-MCNC: 213 MG/DL (ref 0–199)
CO2 SERPL-SCNC: 25 MMOL/L (ref 21–32)
CREAT SERPL-MCNC: 0.9 MG/DL (ref 0.6–1.1)
GFR SERPLBLD CREATININE-BSD FMLA CKD-EPI: 86 ML/MIN/{1.73_M2}
GLUCOSE SERPL-MCNC: 80 MG/DL (ref 70–99)
HDLC SERPL-MCNC: 66 MG/DL (ref 40–60)
LDLC SERPL CALC-MCNC: 123 MG/DL
POTASSIUM SERPL-SCNC: 4.7 MMOL/L (ref 3.5–5.1)
PROLACTIN SERPL IA-MCNC: 13.1 NG/ML
PROT SERPL-MCNC: 7.3 G/DL (ref 6.4–8.2)
SODIUM SERPL-SCNC: 136 MMOL/L (ref 136–145)
T3FREE SERPL-MCNC: 1.9 PG/ML (ref 2.3–4.2)
T4 FREE SERPL-MCNC: 0.8 NG/DL (ref 0.9–1.8)
TRIGL SERPL-MCNC: 118 MG/DL (ref 0–150)
TSH SERPL DL<=0.005 MIU/L-ACNC: 9.46 UIU/ML (ref 0.27–4.2)
VLDLC SERPL CALC-MCNC: 24 MG/DL

## 2024-08-06 PROCEDURE — 1036F TOBACCO NON-USER: CPT | Performed by: INTERNAL MEDICINE

## 2024-08-06 PROCEDURE — G8417 CALC BMI ABV UP PARAM F/U: HCPCS | Performed by: INTERNAL MEDICINE

## 2024-08-06 PROCEDURE — G8427 DOCREV CUR MEDS BY ELIG CLIN: HCPCS | Performed by: INTERNAL MEDICINE

## 2024-08-06 PROCEDURE — 99214 OFFICE O/P EST MOD 30 MIN: CPT | Performed by: INTERNAL MEDICINE

## 2024-08-06 RX ORDER — PHENTERMINE AND TOPIRAMATE 7.5; 46 MG/1; MG/1
1 CAPSULE, EXTENDED RELEASE ORAL DAILY
Qty: 30 CAPSULE | Refills: 2 | Status: SHIPPED | OUTPATIENT
Start: 2024-08-06 | End: 2024-09-05

## 2024-08-06 RX ORDER — LEVOTHYROXINE SODIUM 0.05 MG/1
50 TABLET ORAL DAILY
Qty: 90 TABLET | Refills: 1 | Status: SHIPPED | OUTPATIENT
Start: 2024-08-06

## 2024-08-06 RX ORDER — LEVOTHYROXINE SODIUM 0.2 MG/1
200 TABLET ORAL DAILY
Qty: 90 TABLET | Refills: 1 | Status: SHIPPED | OUTPATIENT
Start: 2024-08-06

## 2024-08-08 LAB
SHBG SERPL-SCNC: 47 NMOL/L (ref 25–122)
TESTOST FREE SERPL-MCNC: 3.1 PG/ML (ref 1.3–9.2)
TESTOST SERPL-MCNC: 22 NG/DL (ref 8–48)

## 2024-08-09 ENCOUNTER — TELEPHONE (OUTPATIENT)
Dept: ENDOCRINOLOGY | Age: 34
End: 2024-08-09

## 2024-08-09 LAB — DHEA-S SERPL-MCNC: 192 UG/DL (ref 98.8–340)

## 2024-08-10 NOTE — TELEPHONE ENCOUNTER
Abnormal TSH 9.46.  Please confirm that she was taking thyroid medication levothyroxine 200+50 mcg daily.  If she is not missing any, increase levothyroxine to 200+75 mcg daily.  Will send prescription to pharmacy.  Let me know.  She has elevated LDL cholesterol. Otherwise lab results are good.  Treat thyroid first.

## 2024-08-13 NOTE — TELEPHONE ENCOUNTER
2nd attempt to contact pt, states the person you are trying to call is not accepting calls at this time

## 2025-02-01 LAB
A/G RATIO: 1.8 RATIO (ref 0.8–2.6)
ALBUMIN: 4.7 G/DL (ref 3.5–5.2)
ALP BLD-CCNC: 59 U/L (ref 23–144)
ALT SERPL-CCNC: 23 U/L (ref 0–60)
AST SERPL-CCNC: 20 U/L (ref 0–55)
BILIRUB SERPL-MCNC: 0.6 MG/DL (ref 0–1.2)
BUN / CREAT RATIO: 12 (ref 7–25)
BUN BLDV-MCNC: 11 MG/DL (ref 3–29)
CALCIUM SERPL-MCNC: 9.8 MG/DL (ref 8.5–10.5)
CHLORIDE BLD-SCNC: 103 MEQ/L (ref 96–110)
CHOLESTEROL, TOTAL: 173 MG/DL
CO2: 26 MEQ/L (ref 19–32)
CREAT SERPL-MCNC: 0.9 MG/DL (ref 0.5–1.2)
DHEAS (DHEA SULFATE): 246 MCG/DL (ref 40–325)
ESTIMATED GLOMERULAR FILTRATION RATE CREATININE EQUATION: 86 MLS/MIN/1.73M2
FASTING STATUS: NORMAL
GLOBULIN: 2.6 G/DL (ref 1.9–3.6)
GLUCOSE BLD-MCNC: 89 MG/DL (ref 70–99)
HDLC SERPL-MCNC: 52 MG/DL
LDL CHOLESTEROL: 99 MG/DL
POTASSIUM SERPL-SCNC: 4.6 MEQ/L (ref 3.4–5.3)
PROLACTIN: 18 NG/ML
SODIUM BLD-SCNC: 140 MEQ/L (ref 135–148)
T3 FREE: 4.4 PG/ML (ref 2.3–4.2)
T4 FREE: 2.12 NG/DL (ref 0.8–1.8)
TOTAL PROTEIN: 7.3 G/DL (ref 6–8.3)
TRIGL SERPL-MCNC: 110 MG/DL
TSH ULTRASENSITIVE: 0.57 MCIU/ML (ref 0.4–4.5)
VLDLC SERPL CALC-MCNC: 22 MG/DL (ref 4–30)

## 2025-02-04 ENCOUNTER — OFFICE VISIT (OUTPATIENT)
Dept: ENDOCRINOLOGY | Age: 35
End: 2025-02-04
Payer: COMMERCIAL

## 2025-02-04 VITALS
WEIGHT: 188 LBS | BODY MASS INDEX: 32.1 KG/M2 | HEART RATE: 81 BPM | OXYGEN SATURATION: 97 % | SYSTOLIC BLOOD PRESSURE: 125 MMHG | TEMPERATURE: 98 F | RESPIRATION RATE: 14 BRPM | DIASTOLIC BLOOD PRESSURE: 71 MMHG | HEIGHT: 64 IN

## 2025-02-04 DIAGNOSIS — N91.5 OLIGOMENORRHEA, UNSPECIFIED TYPE: ICD-10-CM

## 2025-02-04 DIAGNOSIS — E78.2 MIXED HYPERLIPIDEMIA: ICD-10-CM

## 2025-02-04 DIAGNOSIS — E03.9 ACQUIRED HYPOTHYROIDISM: Primary | ICD-10-CM

## 2025-02-04 DIAGNOSIS — E66.811 CLASS 1 OBESITY WITH SERIOUS COMORBIDITY AND BODY MASS INDEX (BMI) OF 32.0 TO 32.9 IN ADULT, UNSPECIFIED OBESITY TYPE: ICD-10-CM

## 2025-02-04 DIAGNOSIS — E04.2 MULTINODULAR THYROID: ICD-10-CM

## 2025-02-04 DIAGNOSIS — E06.3 HASHIMOTO'S THYROIDITIS: ICD-10-CM

## 2025-02-04 PROCEDURE — 1036F TOBACCO NON-USER: CPT | Performed by: INTERNAL MEDICINE

## 2025-02-04 PROCEDURE — G8427 DOCREV CUR MEDS BY ELIG CLIN: HCPCS | Performed by: INTERNAL MEDICINE

## 2025-02-04 PROCEDURE — 99214 OFFICE O/P EST MOD 30 MIN: CPT | Performed by: INTERNAL MEDICINE

## 2025-02-04 PROCEDURE — G8417 CALC BMI ABV UP PARAM F/U: HCPCS | Performed by: INTERNAL MEDICINE

## 2025-02-04 NOTE — PROGRESS NOTES
able to come for appointment because of Covid.  On Qsymia 1/2022-6/2022.  Restarted Qsymia 4/2023. Wt 180 lbs.  Has some vision discomfort an decrease in sleep on 11.25/69 dose  No palpitations, anxiety.  Qsymia 7.5/46 mg - no side effects.  Stopped in 7/2023. Weight 168 lbs.  Lost 6.6% of body weight on Qsymia.  Effective.  Stopped it because of cost in 6/2024.    Gained weight.  Resumed Qsymia 7.5/46 mg daily  3/6/2024 weight 183 lbs.  8/6/2024 weight 168 lbs.    Do to recommend Qsymia until cause of syncopal episodes is known,  Will see Cardiologist.  Also dicussed Wegovy, Zepbound, but insurance does not cover.    4. Oligomenorrhea, unspecified type  Continue monitoring  Prolactin 15-18  Estradiol, LH, FSH, 17 hydroxyprogesterone, IGF-I normal  DHEA-S 154-246  Testosterone normal  3 salivary cortisol samples negative  On IUD no periods.    5.  Multinodular thyroid  Small nodules, monitor  Thyroid ultrasound 8/9/2021-right 5 mm, left 4 mm and another 4 mm nodules.  4/14/2023 right 0.5 cm, left 0.4 cm, left 0.6 cm nodules, TR4  7/22/2024  right 4 mm, TR3, left 5 mm, 3 mm nodules, TR3.    Counseled patient about monitoring thyroid nodule growth  - Thyroid sonogram    6. Hyperlipidemia  -99  -110  HDL 67-52  High cholesterol on father's side.  Father had high cholesterol.  Low fat low cholesterol diet.  - Lipid panel      Reviewed and/or ordered clinical lab results Yes  Reviewed and/or ordered radiology tests No   Reviewed and/or ordered other diagnostic tests No  Discussed test results with performing physician No  Independently reviewed image, tracing, or specimen No  Made a decision to obtain old records No  Reviewed and summarized old records Yes   TSH 9.-74.12-3.13  TPO, Tg ab negative  Obtained history from other than patient No    Randolph Mcnulty was counseled regarding symptoms of hypothyroidism, Hashimoto's thyroiditis, oligomenorrhea, abnormal weight gain diagnosis, course and

## 2025-02-06 LAB
TESTOSTERONE FREE-MALE: 2.7 PG/ML (ref 0.1–6.4)
TESTOSTERONE TOTAL-MALE: 23 NG/DL (ref 2–45)

## 2025-08-04 LAB
A/G RATIO: 1.8 RATIO (ref 0.8–2.6)
ALBUMIN: 4.5 G/DL (ref 3.5–5.2)
ALP BLD-CCNC: 52 U/L (ref 23–144)
ALT SERPL-CCNC: 22 U/L (ref 0–60)
AST SERPL-CCNC: 22 U/L (ref 0–55)
BILIRUB SERPL-MCNC: 0.5 MG/DL (ref 0–1.2)
BUN / CREAT RATIO: 13 (ref 7–25)
BUN BLDV-MCNC: 14 MG/DL (ref 3–29)
CALCIUM SERPL-MCNC: 9.6 MG/DL (ref 8.5–10.5)
CHLORIDE BLD-SCNC: 103 MEQ/L (ref 96–110)
CHOLESTEROL, TOTAL: 210 MG/DL
CO2: 27 MEQ/L (ref 19–32)
CREAT SERPL-MCNC: 1.1 MG/DL (ref 0.5–1.2)
DHEAS (DHEA SULFATE): 193 MCG/DL (ref 40–325)
ESTIMATED GLOMERULAR FILTRATION RATE CREATININE EQUATION: 67 MLS/MIN/1.73M2
FASTING STATUS: NORMAL
GLOBULIN: 2.5 G/DL (ref 1.9–3.6)
GLUCOSE BLD-MCNC: 91 MG/DL (ref 70–99)
HDLC SERPL-MCNC: 51 MG/DL
LDL CHOLESTEROL: 127 MG/DL
POTASSIUM SERPL-SCNC: 4.6 MEQ/L (ref 3.4–5.3)
PROLACTIN: 22 NG/ML
SODIUM BLD-SCNC: 140 MEQ/L (ref 135–148)
T3 FREE: 2.4 PG/ML (ref 2.3–4.2)
T4 FREE: 0.91 NG/DL (ref 0.8–1.8)
TOTAL PROTEIN: 7 G/DL (ref 6–8.3)
TRIGL SERPL-MCNC: 162 MG/DL
TSH ULTRASENSITIVE: 34.2 MCIU/ML (ref 0.4–4.5)
VLDLC SERPL CALC-MCNC: 32 MG/DL (ref 4–30)

## 2025-08-05 ENCOUNTER — OFFICE VISIT (OUTPATIENT)
Dept: ENDOCRINOLOGY | Age: 35
End: 2025-08-05
Payer: COMMERCIAL

## 2025-08-05 VITALS
TEMPERATURE: 98 F | BODY MASS INDEX: 32.1 KG/M2 | HEIGHT: 64 IN | HEART RATE: 54 BPM | WEIGHT: 188 LBS | DIASTOLIC BLOOD PRESSURE: 63 MMHG | SYSTOLIC BLOOD PRESSURE: 109 MMHG | RESPIRATION RATE: 14 BRPM | OXYGEN SATURATION: 98 %

## 2025-08-05 DIAGNOSIS — E78.2 MIXED HYPERLIPIDEMIA: ICD-10-CM

## 2025-08-05 DIAGNOSIS — E06.3 HASHIMOTO'S THYROIDITIS: ICD-10-CM

## 2025-08-05 DIAGNOSIS — E04.2 MULTINODULAR THYROID: ICD-10-CM

## 2025-08-05 DIAGNOSIS — E03.9 ACQUIRED HYPOTHYROIDISM: Primary | ICD-10-CM

## 2025-08-05 DIAGNOSIS — E66.811 CLASS 1 OBESITY WITH SERIOUS COMORBIDITY AND BODY MASS INDEX (BMI) OF 32.0 TO 32.9 IN ADULT, UNSPECIFIED OBESITY TYPE: ICD-10-CM

## 2025-08-05 DIAGNOSIS — N91.5 OLIGOMENORRHEA, UNSPECIFIED TYPE: ICD-10-CM

## 2025-08-05 PROCEDURE — 99214 OFFICE O/P EST MOD 30 MIN: CPT | Performed by: INTERNAL MEDICINE

## 2025-08-05 PROCEDURE — G8427 DOCREV CUR MEDS BY ELIG CLIN: HCPCS | Performed by: INTERNAL MEDICINE

## 2025-08-05 PROCEDURE — G8417 CALC BMI ABV UP PARAM F/U: HCPCS | Performed by: INTERNAL MEDICINE

## 2025-08-05 PROCEDURE — 1036F TOBACCO NON-USER: CPT | Performed by: INTERNAL MEDICINE

## 2025-08-05 RX ORDER — PHENTERMINE AND TOPIRAMATE EXTENDED-RELEASE 15; 92 MG/1; MG/1
1 CAPSULE ORAL DAILY
Qty: 30 CAPSULE | Refills: 2 | Status: SHIPPED | OUTPATIENT
Start: 2025-08-05 | End: 2025-09-02

## 2025-08-05 RX ORDER — LEVOTHYROXINE SODIUM 200 UG/1
200 TABLET ORAL DAILY
Qty: 90 TABLET | Refills: 1 | Status: SHIPPED | OUTPATIENT
Start: 2025-08-05

## 2025-08-05 RX ORDER — LEVOTHYROXINE SODIUM 25 UG/1
25 TABLET ORAL DAILY
Qty: 90 TABLET | Refills: 1 | Status: SHIPPED | OUTPATIENT
Start: 2025-08-05

## 2025-08-07 LAB
TESTOSTERONE FREE-MALE: 3.8 PG/ML (ref 0.1–6.4)
TESTOSTERONE TOTAL-MALE: 24 NG/DL (ref 2–45)

## 2025-08-08 ENCOUNTER — TELEPHONE (OUTPATIENT)
Dept: ENDOCRINOLOGY | Age: 35
End: 2025-08-08